# Patient Record
Sex: FEMALE | Race: BLACK OR AFRICAN AMERICAN | NOT HISPANIC OR LATINO | Employment: UNEMPLOYED | URBAN - METROPOLITAN AREA
[De-identification: names, ages, dates, MRNs, and addresses within clinical notes are randomized per-mention and may not be internally consistent; named-entity substitution may affect disease eponyms.]

---

## 2023-05-25 ENCOUNTER — PATIENT OUTREACH (OUTPATIENT)
Dept: PRIMARY CARE | Facility: CLINIC | Age: 43
End: 2023-05-25
Payer: MEDICAID

## 2023-05-25 DIAGNOSIS — L02.31 GLUTEAL ABSCESS: ICD-10-CM

## 2023-05-25 DIAGNOSIS — E11.00 TYPE 2 DIABETES MELLITUS WITH HYPEROSMOLARITY WITHOUT COMA, WITHOUT LONG-TERM CURRENT USE OF INSULIN (MULTI): ICD-10-CM

## 2023-05-25 DIAGNOSIS — N18.9 CHRONIC KIDNEY DISEASE, UNSPECIFIED CKD STAGE: ICD-10-CM

## 2023-05-25 DIAGNOSIS — E16.2 HYPOGLYCEMIA: ICD-10-CM

## 2023-05-25 DIAGNOSIS — D50.9 IRON DEFICIENCY ANEMIA, UNSPECIFIED IRON DEFICIENCY ANEMIA TYPE: ICD-10-CM

## 2023-05-25 RX ORDER — TRAMADOL HYDROCHLORIDE 50 MG/1
50 TABLET ORAL
COMMUNITY
End: 2023-06-06 | Stop reason: SDUPTHER

## 2023-05-25 RX ORDER — ROSUVASTATIN CALCIUM 20 MG/1
20 TABLET, COATED ORAL DAILY
COMMUNITY
End: 2023-06-08 | Stop reason: SDUPTHER

## 2023-05-25 RX ORDER — POLYETHYLENE GLYCOL 3350 17 G/17G
17 POWDER, FOR SOLUTION ORAL DAILY
COMMUNITY

## 2023-05-25 RX ORDER — CHLORTHALIDONE 25 MG/1
25 TABLET ORAL DAILY
COMMUNITY
End: 2023-06-26 | Stop reason: SDUPTHER

## 2023-05-25 NOTE — PROGRESS NOTES
Discharge Facility:[facility]   Discharge Diagnosis:Hypoglycemia, Iron deficiency anemia, Gluteal abscess, CKD, Type 2 DM  Admission Date:5/21/23  Discharge Date: 5/24/23    PCP Appointment Date:Patient needs follow up visit   Specialist Appointment Date:   Hospital Encounter and Summary: Linked   See discharge assessment below for further details    Engagement  Call Start Time: 0940 (5/25/2023 12:00 PM)    Medications  Medications reviewed with patient/caregiver?: Yes (5/25/2023 12:00 PM)  Is the patient having any side effects they believe may be caused by any medication additions or changes?: No (5/25/2023 12:00 PM)  Does the patient have all medications ordered at discharge?: No (Patient reported not having Jardiance 10mg an rosuavastatin 20 mg, will follow up) (5/25/2023 12:00 PM)  What is keeping the patient from filling the prescriptions?: Patient desires to consult PCP (5/25/2023 12:00 PM)  Prescription Comments: see med list (5/25/2023 12:00 PM)  Is the patient taking all medications as directed (includes completed medication regime)?: Yes (patient did not recieve jardiance 10mg and rosuvastatin 20 mg) (5/25/2023 12:00 PM)  Medication Comments: see med list (5/25/2023 12:00 PM)    Appointments  Does the patient have a primary care provider?: Yes (5/25/2023 12:00 PM)  Care Management Interventions: Advised patient to make appointment; Educated patient on importance of making appointment (5/25/2023 12:00 PM)  Has the patient kept scheduled appointments due by today?: Yes (5/25/2023 12:00 PM)    Patient Teaching  Does the patient have access to their discharge instructions?: Yes (5/25/2023 12:00 PM)  Care Management Interventions: Reviewed instructions with patient (5/25/2023 12:00 PM)  What is the patient's perception of their health status since discharge?: Improving (5/25/2023 12:00 PM)  Is the patient/caregiver able to teach back the hierarchy of who to call/visit for symptoms/problems? PCP, Specialist,  Home Health nurse, Urgent Care, ED, 911: Yes (5/25/2023 12:00 PM)

## 2023-05-26 DIAGNOSIS — E16.2 HYPOGLYCEMIA: ICD-10-CM

## 2023-05-26 DIAGNOSIS — N18.9 CHRONIC KIDNEY DISEASE, UNSPECIFIED CKD STAGE: Primary | ICD-10-CM

## 2023-05-26 DIAGNOSIS — E11.00 TYPE 2 DIABETES MELLITUS WITH HYPEROSMOLARITY WITHOUT COMA, WITHOUT LONG-TERM CURRENT USE OF INSULIN (MULTI): ICD-10-CM

## 2023-05-26 DIAGNOSIS — D50.9 IRON DEFICIENCY ANEMIA, UNSPECIFIED IRON DEFICIENCY ANEMIA TYPE: ICD-10-CM

## 2023-05-26 DIAGNOSIS — L02.31 GLUTEAL ABSCESS: ICD-10-CM

## 2023-06-01 PROBLEM — E55.9 VITAMIN D DEFICIENCY: Status: ACTIVE | Noted: 2023-06-01

## 2023-06-01 PROBLEM — I51.7 LVH (LEFT VENTRICULAR HYPERTROPHY): Status: ACTIVE | Noted: 2023-06-01

## 2023-06-01 PROBLEM — D64.9 ANEMIA: Status: ACTIVE | Noted: 2023-06-01

## 2023-06-01 PROBLEM — K59.03 DRUG-INDUCED CONSTIPATION: Status: ACTIVE | Noted: 2023-06-01

## 2023-06-01 PROBLEM — R73.09 ABNORMAL GLUCOSE: Status: ACTIVE | Noted: 2023-06-01

## 2023-06-01 PROBLEM — J30.9 ALLERGIC SINUSITIS: Status: ACTIVE | Noted: 2023-06-01

## 2023-06-01 PROBLEM — H61.23 BILATERAL IMPACTED CERUMEN: Status: ACTIVE | Noted: 2023-06-01

## 2023-06-01 PROBLEM — K11.8 MASS OF PAROTID GLAND: Status: ACTIVE | Noted: 2023-06-01

## 2023-06-01 PROBLEM — I10 HYPERTENSION: Status: ACTIVE | Noted: 2023-06-01

## 2023-06-01 PROBLEM — R94.31 ABNORMAL EKG: Status: ACTIVE | Noted: 2023-06-01

## 2023-06-01 PROBLEM — E04.9 GOITER: Status: ACTIVE | Noted: 2023-06-01

## 2023-06-01 PROBLEM — H53.8 BLURRING, BILATERAL: Status: ACTIVE | Noted: 2023-06-01

## 2023-06-01 PROBLEM — F39 MOOD DISORDER (CMS-HCC): Status: ACTIVE | Noted: 2023-06-01

## 2023-06-01 PROBLEM — E11.9 DIABETES (MULTI): Status: ACTIVE | Noted: 2023-06-01

## 2023-06-01 RX ORDER — AMLODIPINE AND OLMESARTAN MEDOXOMIL 10; 40 MG/1; MG/1
1 TABLET ORAL DAILY
COMMUNITY
End: 2023-08-08 | Stop reason: SDUPTHER

## 2023-06-01 RX ORDER — AMOXICILLIN AND CLAVULANATE POTASSIUM 875; 125 MG/1; MG/1
1 TABLET, FILM COATED ORAL EVERY 12 HOURS
Qty: 20 TABLET | Refills: 0 | COMMUNITY
Start: 2023-05-19 | End: 2023-05-29

## 2023-06-01 RX ORDER — BLACK COHOSH ROOT 200 MG
500 CAPSULE ORAL DAILY
COMMUNITY
Start: 2023-05-19

## 2023-06-01 RX ORDER — FERROUS SULFATE TAB 325 MG (65 MG ELEMENTAL FE) 325 (65 FE) MG
1 TAB ORAL DAILY
COMMUNITY
Start: 2023-05-19 | End: 2023-06-26 | Stop reason: SDUPTHER

## 2023-06-01 RX ORDER — FLASH GLUCOSE SCANNING READER
EACH MISCELLANEOUS
COMMUNITY
Start: 2023-05-22

## 2023-06-01 RX ORDER — SODIUM BICARBONATE 650 MG/1
650 TABLET ORAL 3 TIMES DAILY
COMMUNITY
Start: 2023-05-19 | End: 2023-07-31 | Stop reason: SDUPTHER

## 2023-06-01 RX ORDER — PANTOPRAZOLE SODIUM 40 MG/1
TABLET, DELAYED RELEASE ORAL
COMMUNITY
Start: 2023-05-19 | End: 2023-06-26 | Stop reason: SDUPTHER

## 2023-06-01 RX ORDER — ISOPROPYL ALCOHOL 70 ML/100ML
SWAB TOPICAL
COMMUNITY
Start: 2023-05-22

## 2023-06-01 RX ORDER — METHYLPREDNISOLONE 4 MG/1
TABLET ORAL
COMMUNITY
Start: 2019-05-13 | End: 2023-09-12 | Stop reason: ALTCHOICE

## 2023-06-01 RX ORDER — INSULIN LISPRO 100 [IU]/ML
INJECTION, SOLUTION INTRAVENOUS; SUBCUTANEOUS
COMMUNITY
Start: 2023-05-19 | End: 2023-06-26 | Stop reason: ALTCHOICE

## 2023-06-01 RX ORDER — LANCETS 30 GAUGE
EACH MISCELLANEOUS
COMMUNITY
Start: 2023-05-22

## 2023-06-01 RX ORDER — METFORMIN HYDROCHLORIDE 500 MG/1
2 TABLET ORAL 2 TIMES DAILY
COMMUNITY
Start: 2022-02-01 | End: 2023-06-26 | Stop reason: SINTOL

## 2023-06-01 RX ORDER — IBUPROFEN 200 MG
TABLET ORAL
COMMUNITY
Start: 2023-05-19

## 2023-06-01 RX ORDER — POTASSIUM & SODIUM PHOSPHATES POWDER PACK 280-160-250 MG 280-160-250 MG
PACK ORAL
COMMUNITY
Start: 2023-05-19

## 2023-06-01 RX ORDER — LANOLIN ALCOHOL/MO/W.PET/CERES
1 CREAM (GRAM) TOPICAL 2 TIMES DAILY
COMMUNITY
Start: 2023-05-19 | End: 2023-06-26 | Stop reason: SDUPTHER

## 2023-06-01 RX ORDER — PEN NEEDLE, DIABETIC 32GX 5/32"
NEEDLE, DISPOSABLE MISCELLANEOUS
COMMUNITY
Start: 2023-05-22

## 2023-06-01 RX ORDER — INSULIN HUMAN 100 [IU]/ML
INJECTION, SUSPENSION SUBCUTANEOUS
COMMUNITY
Start: 2023-05-19 | End: 2023-06-13

## 2023-06-01 RX ORDER — BLOOD SUGAR DIAGNOSTIC
STRIP MISCELLANEOUS
COMMUNITY
Start: 2023-05-22

## 2023-06-01 RX ORDER — ATENOLOL AND CHLORTHALIDONE TABLET 100; 25 MG/1; MG/1
1 TABLET ORAL DAILY
COMMUNITY
End: 2023-06-26 | Stop reason: ALTCHOICE

## 2023-06-01 RX ORDER — FLASH GLUCOSE SENSOR
KIT MISCELLANEOUS
COMMUNITY
Start: 2023-05-22 | End: 2023-06-21

## 2023-06-01 RX ORDER — LANCETS 33 GAUGE
EACH MISCELLANEOUS
COMMUNITY
Start: 2023-05-22

## 2023-06-01 RX ORDER — GABAPENTIN 100 MG/1
100 CAPSULE ORAL EVERY 12 HOURS
COMMUNITY
Start: 2023-05-19 | End: 2023-06-30 | Stop reason: SDUPTHER

## 2023-06-01 RX ORDER — VIT C/E/ZN/COPPR/LUTEIN/ZEAXAN 250MG-90MG
25 CAPSULE ORAL DAILY
COMMUNITY
End: 2023-06-26 | Stop reason: ALTCHOICE

## 2023-06-06 ENCOUNTER — TELEMEDICINE (OUTPATIENT)
Dept: PHARMACY | Facility: HOSPITAL | Age: 43
End: 2023-06-06
Payer: MEDICAID

## 2023-06-06 ENCOUNTER — OFFICE VISIT (OUTPATIENT)
Dept: PRIMARY CARE | Facility: CLINIC | Age: 43
End: 2023-06-06
Payer: MEDICAID

## 2023-06-06 VITALS
SYSTOLIC BLOOD PRESSURE: 115 MMHG | DIASTOLIC BLOOD PRESSURE: 80 MMHG | TEMPERATURE: 98.2 F | HEART RATE: 75 BPM | WEIGHT: 178 LBS | BODY MASS INDEX: 31.54 KG/M2 | OXYGEN SATURATION: 100 % | HEIGHT: 63 IN

## 2023-06-06 DIAGNOSIS — E16.2 HYPOGLYCEMIA: Primary | ICD-10-CM

## 2023-06-06 DIAGNOSIS — E16.2 HYPOGLYCEMIA: ICD-10-CM

## 2023-06-06 DIAGNOSIS — L02.31 GLUTEAL ABSCESS: ICD-10-CM

## 2023-06-06 DIAGNOSIS — I10 HYPERTENSION, UNSPECIFIED TYPE: ICD-10-CM

## 2023-06-06 DIAGNOSIS — L03.90 WOUND CELLULITIS: ICD-10-CM

## 2023-06-06 DIAGNOSIS — E11.9 TYPE 2 DIABETES MELLITUS WITHOUT COMPLICATION, WITH LONG-TERM CURRENT USE OF INSULIN (MULTI): ICD-10-CM

## 2023-06-06 DIAGNOSIS — N18.9 CHRONIC KIDNEY DISEASE, UNSPECIFIED CKD STAGE: ICD-10-CM

## 2023-06-06 DIAGNOSIS — E55.9 VITAMIN D DEFICIENCY: ICD-10-CM

## 2023-06-06 DIAGNOSIS — F39 MOOD DISORDER (CMS-HCC): ICD-10-CM

## 2023-06-06 DIAGNOSIS — Z79.4 TYPE 2 DIABETES MELLITUS WITHOUT COMPLICATION, WITH LONG-TERM CURRENT USE OF INSULIN (MULTI): ICD-10-CM

## 2023-06-06 DIAGNOSIS — E11.00 TYPE 2 DIABETES MELLITUS WITH HYPEROSMOLARITY WITHOUT COMA, WITHOUT LONG-TERM CURRENT USE OF INSULIN (MULTI): ICD-10-CM

## 2023-06-06 DIAGNOSIS — L02.91 ABSCESS: ICD-10-CM

## 2023-06-06 DIAGNOSIS — D64.9 ANEMIA, UNSPECIFIED TYPE: ICD-10-CM

## 2023-06-06 DIAGNOSIS — D50.9 IRON DEFICIENCY ANEMIA, UNSPECIFIED IRON DEFICIENCY ANEMIA TYPE: ICD-10-CM

## 2023-06-06 PROBLEM — R68.84 JAW PAIN: Status: ACTIVE | Noted: 2023-06-06

## 2023-06-06 PROCEDURE — 99495 TRANSJ CARE MGMT MOD F2F 14D: CPT | Performed by: STUDENT IN AN ORGANIZED HEALTH CARE EDUCATION/TRAINING PROGRAM

## 2023-06-06 PROCEDURE — 3074F SYST BP LT 130 MM HG: CPT | Performed by: STUDENT IN AN ORGANIZED HEALTH CARE EDUCATION/TRAINING PROGRAM

## 2023-06-06 PROCEDURE — 3066F NEPHROPATHY DOC TX: CPT | Performed by: STUDENT IN AN ORGANIZED HEALTH CARE EDUCATION/TRAINING PROGRAM

## 2023-06-06 PROCEDURE — 3079F DIAST BP 80-89 MM HG: CPT | Performed by: STUDENT IN AN ORGANIZED HEALTH CARE EDUCATION/TRAINING PROGRAM

## 2023-06-06 RX ORDER — TRAMADOL HYDROCHLORIDE 50 MG/1
50 TABLET ORAL EVERY 4 HOURS PRN
Qty: 40 TABLET | Refills: 0 | Status: SHIPPED | OUTPATIENT
Start: 2023-06-06 | End: 2023-06-13

## 2023-06-06 ASSESSMENT — ENCOUNTER SYMPTOMS
DEPRESSION: 0
OCCASIONAL FEELINGS OF UNSTEADINESS: 0
LOSS OF SENSATION IN FEET: 0

## 2023-06-06 ASSESSMENT — COLUMBIA-SUICIDE SEVERITY RATING SCALE - C-SSRS
2. HAVE YOU ACTUALLY HAD ANY THOUGHTS OF KILLING YOURSELF?: NO
1. IN THE PAST MONTH, HAVE YOU WISHED YOU WERE DEAD OR WISHED YOU COULD GO TO SLEEP AND NOT WAKE UP?: NO
6. HAVE YOU EVER DONE ANYTHING, STARTED TO DO ANYTHING, OR PREPARED TO DO ANYTHING TO END YOUR LIFE?: NO

## 2023-06-06 ASSESSMENT — PATIENT HEALTH QUESTIONNAIRE - PHQ9
SUM OF ALL RESPONSES TO PHQ9 QUESTIONS 1 AND 2: 0
2. FEELING DOWN, DEPRESSED OR HOPELESS: NOT AT ALL
1. LITTLE INTEREST OR PLEASURE IN DOING THINGS: NOT AT ALL

## 2023-06-06 NOTE — PROGRESS NOTES
"42-year-old female here for hospital follow-up.  Admitted 5/21/2023, discharged 5/24/2023    \"Hospital Course:   JOSE C TORRES is a 42 year old Female with a past medical history of HLD, HTN,   T2DM (last HgA1c 16.5% 5/9/23), CKD stage 3, iron deficiency anemia and LVH. Pt   presented to the ED this morning via EMS after she found unresponsive at home   by her niece. Per niece, blood and sputum was on the ground next to patient.   [place] [facility] [place] a [place] glucose of 26   and repeat glucose was 45. Pt arrived to ED on 15L via nonrebreather mask. Pt   told writer that she took 15 units of NPH this morning and then took a nap   because she was told to wait 30 minutes to eat after taking insulin. Pt did not   check her blood glucose this morning prior to administering 15 units of NPH. Pt   has not been checking her blood glucose at home since she was discharged from   [facility]. She does have a glucometer at home. Pt would benefit from more   diabetes education and home health nurse on d/c to assist with new diabetes   regimen. Initial blood glucose on arrival to ED was 26 and last blood glucose   obtained at 1528 was 162. Labs obtained on admit notable for anemia, elevated   Cr and GFR, thrombocytosis, hypocalcemia, and  hypoglycemia. CT of C-spine,   L-spine, and CTH negative for anything acute. CXR negative. CT chest/abd/pelvis   showed multiple rim enhancing abscesses in the left mid and posterior perineum   and inferior medial left buttock as described in detail above with surrounding   cellulitis and multiple areas of skin breakdown, no evidence of deep muscle   infection or intrapelvic extension, and pulmonary edema with small bilateral   pleural effusions. ACS consulted by writer d/t abscesses seen on imaging. Upon   chart review, patient was just recently hospitalized at [facility] 5/9-5/18 due   to DKA, gluteal abscess, and anemia. Pt had bedside I&D of gluteal abscess on " "  5/9/23 as well as I&D in the OR on 5/18/23. Pt was started on Unasyn during   this admit and transitioned to Augmentin 875 mg BID on 5/19/23 with a stop date   of 5/28/23. Pt was started on insulin this recent admit. Home regimen on   discharge was mild SSI using Humalog pen and NPH BID (15 units every morning   with breakfast and 30 units at bedtime. Blood cultures obtained during recent   hospitalization were NGTD. Wound cultures of abscess obtained on 5/9/23 grew   moderate gram + cocci and bacilli. Pt resting comfortably when seen by writer   in the ED. She denies SOB, chest pain, n/v/d, constipation, fever, chills,   headache, abdominal pain, dysuria, or chest pain. Pt is currently on RA.   Decision made to admit patient to medicine service for further treatment and   management of hypoglycemia.\"      Further, patient had significant KUSHAL, and significant anemia, requiring blood transfusion.    Patient feeling significantly better, some weakness, was doing physical therapy at the hospital and has improving.  Still has open wound on right posterior thigh following multiple abscess drainage, currently bandaged.    Is using continuous glucose monitoring, and following diabetic regimen appropriately.  Taking all medications as prescribed.    12 point ROS reviewed and negative other than as stated in HPI    General: Alert, oriented, pleasant, in no acute distress  HEENT:      Head: normocephalic, atraumatic;      eyes: EOMI, no scleral icterus;   CV: Heart with regular rate and rhythm, normal S1/S2, no murmurs  Lungs: CTAB without wheezing, rhonchi or rales; good respiratory effort, no increased work of breathing  Abdomen: soft, non-tender, non-distended, no masses appreciated  Extremities: no edema, no cyanosis  Neuro: Cranial nerves grossly intact; alert and oriented  Psych: Appropriate mood and affect    1.  Hypoglycemia 2. DMII  - Significant improvement, using freestyle federico and staying at goal  - Following " SSI regimen  -Have discussed with [facility] pharmacist, would like to maximize her other medical options and lower insulin regimen is much as possible given hypoglycemic event  - Continue CGM and close follow-up    3.  KUSHAL  - Repeat CMP    4.  Anemia  - Repeat CBC    5.  Abscesses  - General surgery for further evaluation and management    6.  HTN  - At goal in office  - Continue amlodipine-olmesartan 10-40 mg daily  -Patient will check at home to see if she has been taking atenolol-chlorthalidone, if not, can probably defer at this time given good blood pressure readings    Follow-up 3 months    Christopher D'Amico, DO

## 2023-06-06 NOTE — PROGRESS NOTES
Pharmacist Clinic: Diabetes Management  Jennifer Overton is a 42 y.o. female was referred to Clinical Pharmacy Team for diabetes management.     Referring Provider: Christopher D'Amico, DO     HISTORY OF PRESENT ILLNESS   - Patient states she was diagnosed with diabetes and never took it seriously. After her recent hospitalization she started to take control of her disease state given her BG was above 1000 the wounds on her body were not healing. In turn she ended up in the hospital for a hypoglycemic event.   - T2DM complicated with CKD stage 3, hyper/hypoglycemic events     LAB REVIEW   Glucose   Date Value   05/25/2023 CANCELED   05/24/2023 204 mg/dL (H)   05/22/2023 275 mg/dL (H)     Hemoglobin A1C (%)   Date Value   02/24/2022 9.6 (A)   04/02/2019 9.7     Bicarbonate   Date Value   05/25/2023 CANCELED   05/24/2023 24 mmol/L   05/22/2023 22 mmol/L     Urea Nitrogen   Date Value   05/25/2023 CANCELED   05/24/2023 12 mg/dL   05/22/2023 11 mg/dL     Creatinine   Date Value   05/25/2023 CANCELED   05/24/2023 1.74 mg/dL (H)   05/22/2023 1.54 mg/dL (H)     Lab Results   Component Value Date    HGBA1C 9.6 (A) 02/24/2022    HGBA1C 9.7 04/02/2019     Lab Results   Component Value Date    CHOL 170 02/24/2022    CHOL 183 04/02/2019     Lab Results   Component Value Date    HDL 51.5 02/24/2022    HDL 47.6 04/02/2019     No results found for: LDLCALC  Lab Results   Component Value Date    TRIG 183 (H) 02/24/2022    TRIG 173 (H) 04/02/2019       DIABETES ASSESSMENT    CURRENT PHARMACOTHERAPY  - humalog sliding scale: patient administers approx 5 times per week   --> 2 units for  -199  --> 4 units for -250  --> 6 unit for -300  --> 8 units for  - 400  --> 10 units for anything over 400  - humulin: 15 units in the AM, 30 units in the PM    SECONDARY PREVENTION  - Statin? Yes; rosuvastatin on med list  - ACE-I/ARB? Yes; olmesartan on med list    HISTORICAL PHARMACOTHERAPY  - metformin: patient stopped taking  "this b/c she had ulcers on her butt and she did not want diarrhea to get in it   - trulicity: patient developed hives from the medication, she states she had a hard time breathing and in turn went tot he hospital for further assistance     DISCUSSION:   - Discussed goals of diabetes care with patient, discussed we will be talking every 2-3 days until we are happy with her disease state control   - Discussed CGM: patients last one fell off 3 days early, she has about 11 days left on her current one. Gave patient G phone number to call for replacement for her old one.   - Discussed how patient got on her current insulin regimen, she advised that when she was in the hospital they were giving her shots throughout the day. When she left the hospital they discharged her on the same insulin dosage she was on inpatient.     RECOMMENDATIONS/PLAN  1. Patients diabetes is poorly controlled with most recent A1c of 15.6 % (goal < 7 %).   - Continue all meds under the continuation of care with the referring provider and clinical pharmacy team.  - Follow up in 2 days for blood glucose readings and to discuss next steps pending updated lab work.     2. Future plans:   1) discontinue mealtime insulin   2) consolidate basal insulin to once daily dosing  3) re-trial GLP1: PA will be needed for alternative GLP1 therapy as trulicity is the preferred medication on the patients insurance, consider once daily glp1 to start for short half life given history of \"allergy\" with trulicity   4) re-trial SGLT2i pending kidney function stabilization     Clinical Pharmacist follow up: 2 days at 2 PM    Thank you,  Shandra Cuellar, PharmD    Verbal consent to manage patient's drug therapy was obtained from the patient. They were informed they may decline to participate or withdraw from participation in pharmacy services at any time.   "

## 2023-06-06 NOTE — PATIENT INSTRUCTIONS
Admission to the hospital, reasons included acute kidney injury with significant increase in creatinine; severe anemia requiring blood transfusion.

## 2023-06-07 ENCOUNTER — LAB (OUTPATIENT)
Dept: LAB | Facility: LAB | Age: 43
End: 2023-06-07
Payer: MEDICAID

## 2023-06-07 DIAGNOSIS — E55.9 VITAMIN D DEFICIENCY: ICD-10-CM

## 2023-06-07 DIAGNOSIS — Z79.4 TYPE 2 DIABETES MELLITUS WITHOUT COMPLICATION, WITH LONG-TERM CURRENT USE OF INSULIN (MULTI): ICD-10-CM

## 2023-06-07 DIAGNOSIS — D64.9 ANEMIA, UNSPECIFIED TYPE: ICD-10-CM

## 2023-06-07 DIAGNOSIS — I10 HYPERTENSION, UNSPECIFIED TYPE: ICD-10-CM

## 2023-06-07 DIAGNOSIS — E11.9 TYPE 2 DIABETES MELLITUS WITHOUT COMPLICATION, WITH LONG-TERM CURRENT USE OF INSULIN (MULTI): ICD-10-CM

## 2023-06-07 DIAGNOSIS — E16.2 HYPOGLYCEMIA: ICD-10-CM

## 2023-06-07 DIAGNOSIS — F39 MOOD DISORDER (CMS-HCC): ICD-10-CM

## 2023-06-07 LAB
ALANINE AMINOTRANSFERASE (SGPT) (U/L) IN SER/PLAS: 7 U/L (ref 7–45)
ALBUMIN (G/DL) IN SER/PLAS: 3.9 G/DL (ref 3.4–5)
ALBUMIN (MG/L) IN URINE: 19.3 MG/L
ALBUMIN/CREATININE (UG/MG) IN URINE: 17.4 UG/MG CRT (ref 0–30)
ALKALINE PHOSPHATASE (U/L) IN SER/PLAS: 48 U/L (ref 33–110)
ANION GAP IN SER/PLAS: 19 MMOL/L (ref 10–20)
ASPARTATE AMINOTRANSFERASE (SGOT) (U/L) IN SER/PLAS: 14 U/L (ref 9–39)
BILIRUBIN TOTAL (MG/DL) IN SER/PLAS: 0.3 MG/DL (ref 0–1.2)
CALCIDIOL (25 OH VITAMIN D3) (NG/ML) IN SER/PLAS: 13 NG/ML
CALCIUM (MG/DL) IN SER/PLAS: 9.7 MG/DL (ref 8.6–10.6)
CARBON DIOXIDE, TOTAL (MMOL/L) IN SER/PLAS: 22 MMOL/L (ref 21–32)
CHLORIDE (MMOL/L) IN SER/PLAS: 99 MMOL/L (ref 98–107)
CHOLESTEROL (MG/DL) IN SER/PLAS: 267 MG/DL (ref 0–199)
CHOLESTEROL IN HDL (MG/DL) IN SER/PLAS: 52.4 MG/DL
CHOLESTEROL/HDL RATIO: 5.1
CREATININE (MG/DL) IN SER/PLAS: 1.42 MG/DL (ref 0.5–1.05)
CREATININE (MG/DL) IN URINE: 111 MG/DL (ref 20–320)
ERYTHROCYTE DISTRIBUTION WIDTH (RATIO) BY AUTOMATED COUNT: 22.1 % (ref 11.5–14.5)
ERYTHROCYTE MEAN CORPUSCULAR HEMOGLOBIN CONCENTRATION (G/DL) BY AUTOMATED: 31.3 G/DL (ref 32–36)
ERYTHROCYTE MEAN CORPUSCULAR VOLUME (FL) BY AUTOMATED COUNT: 94 FL (ref 80–100)
ERYTHROCYTES (10*6/UL) IN BLOOD BY AUTOMATED COUNT: 3.46 X10E12/L (ref 4–5.2)
ESTIMATED AVERAGE GLUCOSE FOR HBA1C: 209 MG/DL
GFR FEMALE: 47 ML/MIN/1.73M2
GLUCOSE (MG/DL) IN SER/PLAS: 267 MG/DL (ref 74–99)
HEMATOCRIT (%) IN BLOOD BY AUTOMATED COUNT: 32.6 % (ref 36–46)
HEMOGLOBIN (G/DL) IN BLOOD: 10.2 G/DL (ref 12–16)
HEMOGLOBIN A1C/HEMOGLOBIN TOTAL IN BLOOD: 8.9 %
LDL: 156 MG/DL (ref 0–99)
LEUKOCYTES (10*3/UL) IN BLOOD BY AUTOMATED COUNT: 7.3 X10E9/L (ref 4.4–11.3)
NON HDL CHOLESTEROL: 215 MG/DL
NRBC (PER 100 WBCS) BY AUTOMATED COUNT: 0 /100 WBC (ref 0–0)
PLATELETS (10*3/UL) IN BLOOD AUTOMATED COUNT: 372 X10E9/L (ref 150–450)
POTASSIUM (MMOL/L) IN SER/PLAS: 3.9 MMOL/L (ref 3.5–5.3)
PROTEIN TOTAL: 8.4 G/DL (ref 6.4–8.2)
SODIUM (MMOL/L) IN SER/PLAS: 136 MMOL/L (ref 136–145)
THYROTROPIN (MIU/L) IN SER/PLAS BY DETECTION LIMIT <= 0.05 MIU/L: 1.77 MIU/L (ref 0.44–3.98)
TRIGLYCERIDE (MG/DL) IN SER/PLAS: 291 MG/DL (ref 0–149)
UREA NITROGEN (MG/DL) IN SER/PLAS: 21 MG/DL (ref 6–23)
VLDL: 58 MG/DL (ref 0–40)

## 2023-06-07 PROCEDURE — 82306 VITAMIN D 25 HYDROXY: CPT

## 2023-06-07 PROCEDURE — 84443 ASSAY THYROID STIM HORMONE: CPT

## 2023-06-07 PROCEDURE — 85027 COMPLETE CBC AUTOMATED: CPT

## 2023-06-07 PROCEDURE — 82043 UR ALBUMIN QUANTITATIVE: CPT

## 2023-06-07 PROCEDURE — 82570 ASSAY OF URINE CREATININE: CPT

## 2023-06-07 PROCEDURE — 80053 COMPREHEN METABOLIC PANEL: CPT

## 2023-06-07 PROCEDURE — 36415 COLL VENOUS BLD VENIPUNCTURE: CPT

## 2023-06-07 PROCEDURE — 83036 HEMOGLOBIN GLYCOSYLATED A1C: CPT

## 2023-06-07 PROCEDURE — 80061 LIPID PANEL: CPT

## 2023-06-08 ENCOUNTER — TELEPHONE (OUTPATIENT)
Dept: PRIMARY CARE | Facility: CLINIC | Age: 43
End: 2023-06-08

## 2023-06-08 ENCOUNTER — TELEMEDICINE (OUTPATIENT)
Dept: PHARMACY | Facility: HOSPITAL | Age: 43
End: 2023-06-08
Payer: MEDICAID

## 2023-06-08 ENCOUNTER — DOCUMENTATION (OUTPATIENT)
Dept: CASE MANAGEMENT | Facility: HOSPITAL | Age: 43
End: 2023-06-08

## 2023-06-08 DIAGNOSIS — E11.9 TYPE 2 DIABETES MELLITUS WITHOUT COMPLICATION, WITH LONG-TERM CURRENT USE OF INSULIN (MULTI): Primary | ICD-10-CM

## 2023-06-08 DIAGNOSIS — E11.00 TYPE 2 DIABETES MELLITUS WITH HYPEROSMOLARITY WITHOUT COMA, WITHOUT LONG-TERM CURRENT USE OF INSULIN (MULTI): Primary | ICD-10-CM

## 2023-06-08 DIAGNOSIS — E55.9 VITAMIN D DEFICIENCY: Primary | ICD-10-CM

## 2023-06-08 DIAGNOSIS — E78.5 HYPERLIPIDEMIA, UNSPECIFIED HYPERLIPIDEMIA TYPE: ICD-10-CM

## 2023-06-08 DIAGNOSIS — Z79.4 TYPE 2 DIABETES MELLITUS WITHOUT COMPLICATION, WITH LONG-TERM CURRENT USE OF INSULIN (MULTI): Primary | ICD-10-CM

## 2023-06-08 RX ORDER — ROSUVASTATIN CALCIUM 20 MG/1
20 TABLET, COATED ORAL DAILY
Qty: 30 TABLET | Refills: 2 | Status: SHIPPED | OUTPATIENT
Start: 2023-06-08 | End: 2023-08-30 | Stop reason: SDUPTHER

## 2023-06-08 RX ORDER — ERGOCALCIFEROL 1.25 MG/1
50000 CAPSULE ORAL
Qty: 12 CAPSULE | Refills: 0 | Status: SHIPPED | OUTPATIENT
Start: 2023-06-08 | End: 2023-07-31 | Stop reason: SDUPTHER

## 2023-06-08 RX ORDER — LIRAGLUTIDE 6 MG/ML
INJECTION SUBCUTANEOUS
Qty: 9 ML | Refills: 2 | Status: SHIPPED | OUTPATIENT
Start: 2023-06-08 | End: 2023-09-28 | Stop reason: SDUPTHER

## 2023-06-08 NOTE — PROGRESS NOTES
Pharmacist Clinic: Diabetes Management  Jennifer Overton is a 42 y.o. female was referred to Clinical Pharmacy Team for diabetes management. At last visit, no medication changes were made.     Referring Provider: Christopher D'Amico, DO     HISTORY OF PRESENT ILLNESS   A1c one month ago of 16.5%, A1c 28 days later of 8.9%. Patient having frequent hypoglycemic events, one w/ losing consciousness and leading hospitalization.     LAB REVIEW   Glucose   Date Value   06/07/2023 267 mg/dL (H)   05/25/2023 CANCELED   05/24/2023 204 mg/dL (H)     Hemoglobin A1C (%)   Date Value   06/07/2023 8.9 (A)   02/24/2022 9.6 (A)   04/02/2019 9.7     Bicarbonate   Date Value   06/07/2023 22 mmol/L   05/25/2023 CANCELED   05/24/2023 24 mmol/L     Urea Nitrogen   Date Value   06/07/2023 21 mg/dL   05/25/2023 CANCELED   05/24/2023 12 mg/dL     Creatinine   Date Value   06/07/2023 1.42 mg/dL (H)   05/25/2023 CANCELED   05/24/2023 1.74 mg/dL (H)     Lab Results   Component Value Date    HGBA1C 8.9 (A) 06/07/2023    HGBA1C 9.6 (A) 02/24/2022    HGBA1C 9.7 04/02/2019     Lab Results   Component Value Date    CHOL 267 (H) 06/07/2023    CHOL 170 02/24/2022    CHOL 183 04/02/2019     Lab Results   Component Value Date    HDL 52.4 06/07/2023    HDL 51.5 02/24/2022    HDL 47.6 04/02/2019     No results found for: LDLCALC  Lab Results   Component Value Date    TRIG 291 (H) 06/07/2023    TRIG 183 (H) 02/24/2022    TRIG 173 (H) 04/02/2019       DIABETES ASSESSMENT    CURRENT PHARMACOTHERAPY  - humalog sliding scale: patient administers approx 5 times per week   --> 2 units for  -199  --> 4 units for -250  --> 6 unit for -300  --> 8 units for  - 400  --> 10 units for anything over 400  - humulin: 15 units in the AM, 30 units in the PM    SECONDARY PREVENTION  - Statin? Yes; rosuvastatin on med list  - ACE-I/ARB? Yes; olmesartan on med list    HISTORICAL PHARMACOTHERAPY  - metformin: patient stopped taking this b/c she had ulcers on  her butt and she did not want diarrhea to get in it   - trulicity: patient developed hives from the medication, she states she had a hard time breathing and in turn went tot he hospital for further assistance     SMBG:   - 6/6 before bed 131   - 6/7 fasting 212   - 6/7 post insulin and lunch (no SS used) 158  - 6/7 symptomatic hypo event 58, patient ate a honey bun   - 6/7 before bed 169   - 6/8 fasting 194    DISCUSSION:   - Discussed SMBG readings, discussed A1c, discussed we do not like to see numbers under 70 as it puts us at risk for hypoglycemic events  - Discussed balancing out insulin   - Discussed once daily injection of Victoza, discussed it is similar to trulicity but it is every day instead of every week, discussed cross-reactivity is low given trulicity allergy     RECOMMENDATIONS/PLAN  1. Patients diabetes is poorly controlled with most recent A1c of 8.9%, down from 16.5% one month prior (goal < 7 %).   - Continue all meds under the continuation of care with the referring provider and clinical pharmacy team.  - Initiate victoza 0.6 mg under the skin once daily. Titrate as tolerated.   - Decrease humulin to 15 units BID.   - Discontinue sliding scale insulin, given minimal use this should not be a problem.   - Keep up the good work of testing your blood glucose frequently throughout the day.     2. Future plans:   - consolidate basal insulin to once daily dosing with a long acting insulin such as tresiba to avoid hypoglycemic events  - re-trial SGLT2i pending kidney function stabilization     Clinical Pharmacist follow up: 5 days at 2 PM    Thank you,  Shandra Cuellar, PharmD    Verbal consent to manage patient's drug therapy was obtained from the patient. They were informed they may decline to participate or withdraw from participation in pharmacy services at any time.

## 2023-06-08 NOTE — PROGRESS NOTES
Call regarding appt. with PCP on 6/6/23 after hospitalization.  At time of outreach call the patient feels as if their condition has returned to baseline since last visit.  Patient reported that she still was waiting on a call from the office to receive dressings for wound changes.  Nursing called office to check on dressings needed for wound changes.  Reviewed with patient the PCP appointment and any questions or concerns regarding the appt.

## 2023-06-08 NOTE — RESULT ENCOUNTER NOTE
Hemoglobin improving, at 10.2,    LDL at 156, well above diabetic goal of 70.  Triglycerides significantly elevated 291, likely secondary to high blood sugar.  Please make sure she is taking her rosuvastatin that is in her chart.    Kidney function has increased, still low from last year's labs.  We will continue to monitor.  Continue to keep blood pressure and blood sugar under control.    Vitamin D significantly low at 13, recommend weekly supplementation x12 weeks, sent to pharmacy with 3-month follow-up lab.    Hemoglobin A1c at 8.9, well above goal of 7, continue to work with Shandra the pharmacist and follow-up every 3 months with me.    Remaining labs unremarkable.

## 2023-06-13 ENCOUNTER — TELEMEDICINE (OUTPATIENT)
Dept: PHARMACY | Facility: HOSPITAL | Age: 43
End: 2023-06-13
Payer: MEDICAID

## 2023-06-13 DIAGNOSIS — E11.00 TYPE 2 DIABETES MELLITUS WITH HYPEROSMOLARITY WITHOUT COMA, WITHOUT LONG-TERM CURRENT USE OF INSULIN (MULTI): Primary | ICD-10-CM

## 2023-06-13 RX ORDER — INSULIN DEGLUDEC 100 U/ML
24 INJECTION, SOLUTION SUBCUTANEOUS EVERY MORNING
Qty: 15 ML | Refills: 1 | Status: SHIPPED | OUTPATIENT
Start: 2023-06-13

## 2023-06-13 NOTE — PROGRESS NOTES
"Pharmacist Clinic: Diabetes Management  Jennifer Overton is a 42 y.o. female was referred to Clinical Pharmacy Team for diabetes management. At last visit, no medication changes were made.     Referring Provider: Christopher D'Amico, DO     HISTORY OF PRESENT ILLNESS   A1c one month ago of 16.5%, A1c 28 days later of 8.9%. Patient having frequent hypoglycemic events, one w/ losing consciousness and leading hospitalization.     LAB REVIEW   Glucose   Date Value   06/07/2023 267 mg/dL (H)   05/25/2023 CANCELED   05/24/2023 204 mg/dL (H)     Hemoglobin A1C (%)   Date Value   06/07/2023 8.9 (A)   05/09/2023 16.5 (H)   02/24/2022 9.6 (A)   04/02/2019 9.7     Bicarbonate   Date Value   06/07/2023 22 mmol/L   05/25/2023 CANCELED   05/24/2023 24 mmol/L     Urea Nitrogen   Date Value   06/07/2023 21 mg/dL   05/25/2023 CANCELED   05/24/2023 12 mg/dL     Creatinine   Date Value   06/07/2023 1.42 mg/dL (H)   05/25/2023 CANCELED   05/24/2023 1.74 mg/dL (H)     Lab Results   Component Value Date    HGBA1C 8.9 (A) 06/07/2023    HGBA1C 16.5 (H) 05/09/2023    HGBA1C 9.6 (A) 02/24/2022     Lab Results   Component Value Date    CHOL 267 (H) 06/07/2023    CHOL 170 02/24/2022    CHOL 183 04/02/2019     Lab Results   Component Value Date    HDL 52.4 06/07/2023    HDL 51.5 02/24/2022    HDL 47.6 04/02/2019     No results found for: \"LDLCALC\"  Lab Results   Component Value Date    TRIG 291 (H) 06/07/2023    TRIG 183 (H) 02/24/2022    TRIG 173 (H) 04/02/2019       DIABETES ASSESSMENT    CURRENT PHARMACOTHERAPY  - humulin: 15 units in the AM, 15 units in the PM  - victoza 0.6 mg under the skin once daily, has not started yet, plan to start next week     SECONDARY PREVENTION  - Statin? Yes; rosuvastatin on med list  - ACE-I/ARB? Yes; olmesartan on med list    HISTORICAL PHARMACOTHERAPY  - metformin: patient stopped taking this b/c she had ulcers on her butt and she did not want diarrhea to get in it   - trulicity: patient developed hives from the " medication, she states she had a hard time breathing and in turn went tot [facility] for further assistance     SMBG:    Fasting  2 hours after lunch   6/9 190 133   6/10 171 159   6/11 158 200   6/12 195 140   6/13 154 190     DISCUSSION:   - Discussed that patient is about to run out of her insulin pens, discussed this is a good time to change her to a better insulin that is once every morning  - Discussed we will decrease her total daily dose and we will chat frequently while we get her to where she needs to be    RECOMMENDATIONS/PLAN  1. Patients diabetes is poorly controlled with most recent A1c of 8.9%, down from 16.5% one month prior (goal < 7 %).   - Continue all meds under the continuation of care with the referring provider and clinical pharmacy team.  - Discontinue humulin 15 units BID.   - Initiate Tresiba 24 units under the skin once every morning. Current TDD of insulin of 30 units, decrease by 20% for 24 units once daily.     2. Future plans:   - re-trial SGLT2i pending kidney function stabilization     Clinical Pharmacist follow up: 2 days at 2 PM    Thank you,  Shandra Cuellar, PharmD    Verbal consent to manage patient's drug therapy was obtained from the patient. They were informed they may decline to participate or withdraw from participation in pharmacy services at any time.

## 2023-06-15 ENCOUNTER — APPOINTMENT (OUTPATIENT)
Dept: PHARMACY | Facility: HOSPITAL | Age: 43
End: 2023-06-15
Payer: MEDICAID

## 2023-06-19 ENCOUNTER — TELEMEDICINE (OUTPATIENT)
Dept: PHARMACY | Facility: HOSPITAL | Age: 43
End: 2023-06-19
Payer: MEDICAID

## 2023-06-19 DIAGNOSIS — E11.00 TYPE 2 DIABETES MELLITUS WITH HYPEROSMOLARITY WITHOUT COMA, WITHOUT LONG-TERM CURRENT USE OF INSULIN (MULTI): Primary | ICD-10-CM

## 2023-06-19 NOTE — PROGRESS NOTES
"Pharmacist Clinic: Diabetes Management  Jennifer Overton is a 42 y.o. female was referred to Clinical Pharmacy Team for diabetes management. At last visit, insulin was changed to once daily lantus.     Referring Provider: Christopher D'Amico, DO     HISTORY OF PRESENT ILLNESS   A1c one month ago of 16.5%, A1c 28 days later of 8.9%. Patient having frequent hypoglycemic events, one w/ losing consciousness and leading hospitalization.     LAB REVIEW   Glucose   Date Value   06/07/2023 267 mg/dL (H)   05/25/2023 CANCELED   05/24/2023 204 mg/dL (H)     Hemoglobin A1C (%)   Date Value   06/07/2023 8.9 (A)   05/09/2023 16.5 (H)   02/24/2022 9.6 (A)   04/02/2019 9.7     Bicarbonate   Date Value   06/07/2023 22 mmol/L   05/25/2023 CANCELED   05/24/2023 24 mmol/L     Urea Nitrogen   Date Value   06/07/2023 21 mg/dL   05/25/2023 CANCELED   05/24/2023 12 mg/dL     Creatinine   Date Value   06/07/2023 1.42 mg/dL (H)   05/25/2023 CANCELED   05/24/2023 1.74 mg/dL (H)     Lab Results   Component Value Date    HGBA1C 8.9 (A) 06/07/2023    HGBA1C 16.5 (H) 05/09/2023    HGBA1C 9.6 (A) 02/24/2022     Lab Results   Component Value Date    CHOL 267 (H) 06/07/2023    CHOL 170 02/24/2022    CHOL 183 04/02/2019     Lab Results   Component Value Date    HDL 52.4 06/07/2023    HDL 51.5 02/24/2022    HDL 47.6 04/02/2019     No results found for: \"LDLCALC\"  Lab Results   Component Value Date    TRIG 291 (H) 06/07/2023    TRIG 183 (H) 02/24/2022    TRIG 173 (H) 04/02/2019       DIABETES ASSESSMENT    CURRENT PHARMACOTHERAPY  - lantus 24 units once daily.   - victoza 0.6 mg under the skin once daily    SECONDARY PREVENTION  - Statin? Yes; rosuvastatin on med list  - ACE-I/ARB? Yes; olmesartan on med list    HISTORICAL PHARMACOTHERAPY  - metformin: patient stopped taking this b/c she had ulcers on her butt and she did not want diarrhea to get in it   - trulicity: patient developed hives from the medication, she states she had a hard time breathing and " in turn went tot [facility] for further assistance     SMBG: high 100-200 range    DISCUSSION:   - Patient is tolerating victoza without any issues, she notices her appetite is down significantly   - Patient is getting used to lantus, she states her sugars are much higher than they previously were which makes her anxious   - Reminded her we decreased the dose and plan to titrate it slowly back up    RECOMMENDATIONS/PLAN  1. Patients diabetes is poorly controlled with most recent A1c of 8.9%, down from 16.5% one month prior (goal < 7 %).   - Continue all meds under the continuation of care with the referring provider and clinical pharmacy team.    2. Future plans:   - re-trial SGLT2i pending kidney function stabilization     Clinical Pharmacist follow up: 1 week  - will speak with patient daily to titrate insulin as appropriate     Thank you,  Shandra Cuellar, PharmD    Verbal consent to manage patient's drug therapy was obtained from the patient. They were informed they may decline to participate or withdraw from participation in pharmacy services at any time.

## 2023-06-21 RX ORDER — FLASH GLUCOSE SENSOR
KIT MISCELLANEOUS
Qty: 2 EACH | Refills: 5 | Status: SHIPPED | OUTPATIENT
Start: 2023-06-21

## 2023-06-26 ENCOUNTER — TELEMEDICINE (OUTPATIENT)
Dept: PHARMACY | Facility: HOSPITAL | Age: 43
End: 2023-06-26
Payer: MEDICAID

## 2023-06-26 DIAGNOSIS — K21.9 GASTROESOPHAGEAL REFLUX DISEASE, UNSPECIFIED WHETHER ESOPHAGITIS PRESENT: ICD-10-CM

## 2023-06-26 DIAGNOSIS — E11.00 TYPE 2 DIABETES MELLITUS WITH HYPEROSMOLARITY WITHOUT COMA, WITHOUT LONG-TERM CURRENT USE OF INSULIN (MULTI): ICD-10-CM

## 2023-06-26 DIAGNOSIS — D50.9 IRON DEFICIENCY ANEMIA, UNSPECIFIED IRON DEFICIENCY ANEMIA TYPE: Primary | ICD-10-CM

## 2023-06-26 DIAGNOSIS — K21.9 GASTROESOPHAGEAL REFLUX DISEASE, UNSPECIFIED WHETHER ESOPHAGITIS PRESENT: Primary | ICD-10-CM

## 2023-06-26 DIAGNOSIS — I10 HYPERTENSION, UNSPECIFIED TYPE: ICD-10-CM

## 2023-06-26 DIAGNOSIS — E61.2 MAGNESIUM DEFICIENCY: ICD-10-CM

## 2023-06-26 RX ORDER — PANTOPRAZOLE SODIUM 40 MG/1
TABLET, DELAYED RELEASE ORAL
Qty: 30 TABLET | Refills: 2 | Status: SHIPPED | OUTPATIENT
Start: 2023-06-26 | End: 2023-09-05 | Stop reason: SDUPTHER

## 2023-06-26 RX ORDER — FERROUS SULFATE 325(65) MG
1 TABLET ORAL DAILY
Qty: 30 TABLET | Refills: 2 | Status: SHIPPED | OUTPATIENT
Start: 2023-06-26 | End: 2023-09-05 | Stop reason: SDUPTHER

## 2023-06-26 RX ORDER — LANOLIN ALCOHOL/MO/W.PET/CERES
1 CREAM (GRAM) TOPICAL 2 TIMES DAILY
Qty: 60 TABLET | Refills: 2 | Status: SHIPPED | OUTPATIENT
Start: 2023-06-26 | End: 2023-11-30 | Stop reason: SDUPTHER

## 2023-06-26 RX ORDER — CHLORTHALIDONE 25 MG/1
25 TABLET ORAL DAILY
Qty: 30 TABLET | Refills: 2 | Status: SHIPPED | OUTPATIENT
Start: 2023-06-26 | End: 2023-09-05 | Stop reason: SDUPTHER

## 2023-06-26 NOTE — PROGRESS NOTES
"Pharmacist Clinic: Diabetes Management  Jennifer Overton is a 42 y.o. female was referred to Clinical Pharmacy Team for diabetes management. At last visit, insulin was changed to once daily lantus.     Referring Provider: Christopher D'Amico, DO     HISTORY OF PRESENT ILLNESS   A1c one month ago of 16.5%, A1c 28 days later of 8.9%. Patient having frequent hypoglycemic events, one w/ losing consciousness and leading hospitalization.     LAB REVIEW   Glucose   Date Value   06/07/2023 267 mg/dL (H)   05/25/2023 CANCELED   05/24/2023 204 mg/dL (H)     Hemoglobin A1C (%)   Date Value   06/07/2023 8.9 (A)   05/09/2023 16.5 (H)   02/24/2022 9.6 (A)   04/02/2019 9.7     Bicarbonate   Date Value   06/07/2023 22 mmol/L   05/25/2023 CANCELED   05/24/2023 24 mmol/L     Urea Nitrogen   Date Value   06/07/2023 21 mg/dL   05/25/2023 CANCELED   05/24/2023 12 mg/dL     Creatinine   Date Value   06/07/2023 1.42 mg/dL (H)   05/25/2023 CANCELED   05/24/2023 1.74 mg/dL (H)     Lab Results   Component Value Date    HGBA1C 8.9 (A) 06/07/2023    HGBA1C 16.5 (H) 05/09/2023    HGBA1C 9.6 (A) 02/24/2022     Lab Results   Component Value Date    CHOL 267 (H) 06/07/2023    CHOL 170 02/24/2022    CHOL 183 04/02/2019     Lab Results   Component Value Date    HDL 52.4 06/07/2023    HDL 51.5 02/24/2022    HDL 47.6 04/02/2019     No results found for: \"LDLCALC\"  Lab Results   Component Value Date    TRIG 291 (H) 06/07/2023    TRIG 183 (H) 02/24/2022    TRIG 173 (H) 04/02/2019       DIABETES ASSESSMENT    CURRENT PHARMACOTHERAPY  - lantus 24 units once daily.   - victoza 0.6 mg under the skin once daily    SECONDARY PREVENTION  - Statin? Yes; rosuvastatin on med list  - ACE-I/ARB? Yes; olmesartan on med list    HISTORICAL PHARMACOTHERAPY  - metformin: patient stopped taking this b/c she had ulcers on her butt and she did not want diarrhea to get in it   - trulicity: patient developed hives from the medication, she states she had a hard time breathing and " in turn went tot [facility] for further assistance   - jardiance: patient never started the medication    SMBG: high 111-130    DISCUSSION:   - Patient increased her victoza to 1.2 mg and she became itchy again, since she kept her dose at 0.6 mg once daily  - Patient did not take her insulin all weekend, she said her numbers were between 111-130 and she was too scared  - Discussed insulin is something we either take every day or not at all, at this moment she does not want to take it at all    RECOMMENDATIONS/PLAN  1. Patients diabetes is poorly controlled with most recent A1c of 8.9%, down from 16.5% one month prior (goal < 7 %).   - Continue all meds under the continuation of care with the referring provider and clinical pharmacy team.   Patient is due for a refill on 4 medications, she has not taken them all weekend because she has been out.   - Hold insulin, we will have daily follow ups at 11 AM to discuss blood glucose readings.     2. Future plans:   - re-trial SGLT2i pending kidney function stabilization  - patient has been unsuccessful at scheduling with a nephrologist    Clinical Pharmacist follow up: daily    Thank you,  Shandra Cuellar, PharmD    Verbal consent to manage patient's drug therapy was obtained from the patient. They were informed they may decline to participate or withdraw from participation in pharmacy services at any time.

## 2023-06-30 DIAGNOSIS — E11.9 TYPE 2 DIABETES MELLITUS WITHOUT COMPLICATION, WITH LONG-TERM CURRENT USE OF INSULIN (MULTI): Primary | ICD-10-CM

## 2023-06-30 DIAGNOSIS — Z79.4 TYPE 2 DIABETES MELLITUS WITHOUT COMPLICATION, WITH LONG-TERM CURRENT USE OF INSULIN (MULTI): Primary | ICD-10-CM

## 2023-06-30 RX ORDER — GABAPENTIN 100 MG/1
100 CAPSULE ORAL EVERY 12 HOURS
Qty: 60 CAPSULE | Refills: 0 | Status: SHIPPED | OUTPATIENT
Start: 2023-06-30 | End: 2023-07-31 | Stop reason: SDUPTHER

## 2023-07-03 ENCOUNTER — TELEMEDICINE (OUTPATIENT)
Dept: PHARMACY | Facility: HOSPITAL | Age: 43
End: 2023-07-03
Payer: MEDICAID

## 2023-07-03 DIAGNOSIS — E11.00 TYPE 2 DIABETES MELLITUS WITH HYPEROSMOLARITY WITHOUT COMA, WITHOUT LONG-TERM CURRENT USE OF INSULIN (MULTI): ICD-10-CM

## 2023-07-03 DIAGNOSIS — D50.9 IRON DEFICIENCY ANEMIA, UNSPECIFIED IRON DEFICIENCY ANEMIA TYPE: Primary | ICD-10-CM

## 2023-07-03 NOTE — PROGRESS NOTES
"Pharmacist Clinic: Diabetes Management  Jennifer Overton is a 42 y.o. female was referred to Clinical Pharmacy Team for diabetes management. At last visit, patient stopped her insulin.     Referring Provider: Christopher D'Amico, DO     HISTORY OF PRESENT ILLNESS   A1c one month ago of 16.5%, A1c 28 days later of 8.9%. Patient having frequent hypoglycemic events, one w/ losing consciousness and leading hospitalization.     LAB REVIEW   Glucose   Date Value   06/07/2023 267 mg/dL (H)   05/25/2023 CANCELED   05/24/2023 204 mg/dL (H)     Hemoglobin A1C (%)   Date Value   06/07/2023 8.9 (A)   05/09/2023 16.5 (H)   02/24/2022 9.6 (A)   04/02/2019 9.7     Bicarbonate   Date Value   06/07/2023 22 mmol/L   05/25/2023 CANCELED   05/24/2023 24 mmol/L     Urea Nitrogen   Date Value   06/07/2023 21 mg/dL   05/25/2023 CANCELED   05/24/2023 12 mg/dL     Creatinine   Date Value   06/07/2023 1.42 mg/dL (H)   05/25/2023 CANCELED   05/24/2023 1.74 mg/dL (H)     Lab Results   Component Value Date    HGBA1C 8.9 (A) 06/07/2023    HGBA1C 16.5 (H) 05/09/2023    HGBA1C 9.6 (A) 02/24/2022     Lab Results   Component Value Date    CHOL 267 (H) 06/07/2023    CHOL 170 02/24/2022    CHOL 183 04/02/2019     Lab Results   Component Value Date    HDL 52.4 06/07/2023    HDL 51.5 02/24/2022    HDL 47.6 04/02/2019     No results found for: \"LDLCALC\"  Lab Results   Component Value Date    TRIG 291 (H) 06/07/2023    TRIG 183 (H) 02/24/2022    TRIG 173 (H) 04/02/2019       DIABETES ASSESSMENT    CURRENT PHARMACOTHERAPY  - victoza 0.6 mg under the skin once daily    SECONDARY PREVENTION  - Statin? Yes; rosuvastatin on med list  - ACE-I/ARB? Yes; olmesartan on med list    HISTORICAL PHARMACOTHERAPY  - metformin: patient stopped taking this b/c she had ulcers on her butt and she did not want diarrhea to get in it   - trulicity: patient developed hives from the medication, she states she had a hard time breathing and in turn went tot he hospital for further " assistance   - jardiance: patient never started the medication    SMBG:   - between 110-140 throughout the day    DISCUSSION:   - Patients blood glucose has been down since stopping insulin, she is using a CGM to check her sugars on a daily basis   - Patient is tolerating victoza, she is complaining of mild nausea, constipation and lack of appetite  - Patient is complaining of leg pain, she sates her legs feel shakey/tingly  - Patient is 2 days without gabapentin and tramadol, discussed contacting the doctors office/surgeons office for refill  - Went over the concept of neuropathy     RECOMMENDATIONS/PLAN  1. Patients diabetes is poorly controlled with most recent A1c of 8.9%, down from 16.5% one month prior (goal < 7 %).   - Continue all meds under the continuation of care with the referring provider and clinical pharmacy team.  - Follow up in 1 week for blood glucose readings and side effect management if necessary.     2. Future plans:   - re-trial SGLT2i pending kidney function stabilization  - patient has been unsuccessful at scheduling with a nephrologist    Clinical Pharmacist follow up: one week  - patient to call if her BG rises above goal     Thank you,  Shandra Cuellar, PharmD    Verbal consent to manage patient's drug therapy was obtained from the patient. They were informed they may decline to participate or withdraw from participation in pharmacy services at any time.

## 2023-07-31 DIAGNOSIS — E11.9 TYPE 2 DIABETES MELLITUS WITHOUT COMPLICATION, WITH LONG-TERM CURRENT USE OF INSULIN (MULTI): ICD-10-CM

## 2023-07-31 DIAGNOSIS — Z79.4 TYPE 2 DIABETES MELLITUS WITHOUT COMPLICATION, WITH LONG-TERM CURRENT USE OF INSULIN (MULTI): ICD-10-CM

## 2023-07-31 DIAGNOSIS — N18.9 CHRONIC KIDNEY DISEASE, UNSPECIFIED CKD STAGE: Primary | ICD-10-CM

## 2023-07-31 DIAGNOSIS — E55.9 VITAMIN D DEFICIENCY: ICD-10-CM

## 2023-07-31 RX ORDER — ERGOCALCIFEROL 1.25 MG/1
50000 CAPSULE ORAL
Qty: 12 CAPSULE | Refills: 0 | Status: SHIPPED | OUTPATIENT
Start: 2023-07-31 | End: 2023-10-23

## 2023-07-31 RX ORDER — GABAPENTIN 100 MG/1
100 CAPSULE ORAL EVERY 12 HOURS
Qty: 60 CAPSULE | Refills: 0 | Status: SHIPPED | OUTPATIENT
Start: 2023-07-31 | End: 2023-08-30 | Stop reason: SDUPTHER

## 2023-07-31 RX ORDER — SODIUM BICARBONATE 650 MG/1
650 TABLET ORAL 3 TIMES DAILY
Qty: 90 TABLET | Refills: 1 | Status: SHIPPED | OUTPATIENT
Start: 2023-07-31 | End: 2023-09-29 | Stop reason: SDUPTHER

## 2023-08-08 ENCOUNTER — TELEMEDICINE (OUTPATIENT)
Dept: PHARMACY | Facility: HOSPITAL | Age: 43
End: 2023-08-08
Payer: MEDICAID

## 2023-08-08 DIAGNOSIS — E11.00 TYPE 2 DIABETES MELLITUS WITH HYPEROSMOLARITY WITHOUT COMA, WITHOUT LONG-TERM CURRENT USE OF INSULIN (MULTI): Primary | ICD-10-CM

## 2023-08-08 RX ORDER — AMLODIPINE AND OLMESARTAN MEDOXOMIL 10; 40 MG/1; MG/1
1 TABLET ORAL DAILY
Qty: 30 TABLET | Refills: 1 | Status: SHIPPED | OUTPATIENT
Start: 2023-08-08 | End: 2023-09-12 | Stop reason: SDUPTHER

## 2023-08-08 NOTE — PROGRESS NOTES
"Pharmacist Clinic: Diabetes Management  Jennifer Overton is a 43 y.o. female was referred to Clinical Pharmacy Team for diabetes management. At last visit, no medication changes were made.     Referring Provider: Christopher D'Amico, DO     HISTORY OF PRESENT ILLNESS   A1c one month ago of 16.5%, A1c 28 days later of 8.9%. Patient having frequent hypoglycemic events, one w/ losing consciousness and leading hospitalization.     LAB REVIEW   Glucose   Date Value   06/07/2023 267 mg/dL (H)   05/25/2023 CANCELED   05/24/2023 204 mg/dL (H)     Hemoglobin A1C (%)   Date Value   06/07/2023 8.9 (A)   05/09/2023 16.5 (H)   02/24/2022 9.6 (A)   04/02/2019 9.7     Bicarbonate   Date Value   06/07/2023 22 mmol/L   05/25/2023 CANCELED   05/24/2023 24 mmol/L     Urea Nitrogen   Date Value   06/07/2023 21 mg/dL   05/25/2023 CANCELED   05/24/2023 12 mg/dL     Creatinine   Date Value   06/07/2023 1.42 mg/dL (H)   05/25/2023 CANCELED   05/24/2023 1.74 mg/dL (H)     Lab Results   Component Value Date    HGBA1C 8.9 (A) 06/07/2023    HGBA1C 16.5 (H) 05/09/2023    HGBA1C 9.6 (A) 02/24/2022     Lab Results   Component Value Date    CHOL 267 (H) 06/07/2023    CHOL 170 02/24/2022    CHOL 183 04/02/2019     Lab Results   Component Value Date    HDL 52.4 06/07/2023    HDL 51.5 02/24/2022    HDL 47.6 04/02/2019     No results found for: \"LDLCALC\"  Lab Results   Component Value Date    TRIG 291 (H) 06/07/2023    TRIG 183 (H) 02/24/2022    TRIG 173 (H) 04/02/2019       DIABETES ASSESSMENT    CURRENT PHARMACOTHERAPY  - victoza 0.6 mg under the skin once daily; patient increased it and experienced N/V    SECONDARY PREVENTION  - Statin? Yes; rosuvastatin on med list  - ACE-I/ARB? Yes; olmesartan on med list    HISTORICAL PHARMACOTHERAPY  - metformin: patient stopped taking this b/c she had ulcers on her butt and she did not want diarrhea to get in it   - trulicity: patient developed hives from the medication, she states she had a hard time breathing and " in turn went tot [facility] for further assistance   - jardiance: patient never started the medication    SMBG:   - between 110-140 throughout the day    DISCUSSION:   - Patients blood glucose has been down since stopping insulin, she is using a CGM to check her sugars on a daily basis   - Patient is tolerating victoza, she is complaining of mild nausea, constipation and lack of appetite, overall she is tolerating the low dose of the medication and does not want to increase it again at this time   - Patient is 2 days without her BP medication amlodipine/olmesartan 10/40 mg, she is out of refills from her cardiologist     RECOMMENDATIONS/PLAN  1. Patients diabetes is poorly controlled with most recent A1c of 8.9%, down from 16.5% one month prior (goal < 7 %).   - Continue all meds under the continuation of care with the referring provider and clinical pharmacy team.     2. Future considerations:    - re-trial SGLT2i pending kidney function stabilization  - patient has been unsuccessful at scheduling with a nephrologist    Clinical Pharmacist follow up: two weeks  - patient to call if her BG rises above goal     Thank you,  Shandra Cuellar, PharmD    Verbal consent to manage patient's drug therapy was obtained from the patient. They were informed they may decline to participate or withdraw from participation in pharmacy services at any time.

## 2023-08-22 ENCOUNTER — TELEMEDICINE (OUTPATIENT)
Dept: PHARMACY | Facility: HOSPITAL | Age: 43
End: 2023-08-22
Payer: MEDICAID

## 2023-08-22 DIAGNOSIS — E11.00 TYPE 2 DIABETES MELLITUS WITH HYPEROSMOLARITY WITHOUT COMA, WITHOUT LONG-TERM CURRENT USE OF INSULIN (MULTI): Primary | ICD-10-CM

## 2023-08-22 NOTE — PROGRESS NOTES
"Pharmacist Clinic: Diabetes Management  Jennifer Overton is a 43 y.o. female was referred to Clinical Pharmacy Team for diabetes management. At last visit, no medication changes were made.     Referring Provider: Christopher D'Amico, DO     HISTORY OF PRESENT ILLNESS   A1c one month ago of 16.5%, A1c 28 days later of 8.9%. Patient having frequent hypoglycemic events, one w/ losing consciousness and leading hospitalization.     LAB REVIEW   Glucose   Date Value   06/07/2023 267 mg/dL (H)   05/25/2023 CANCELED   05/24/2023 204 mg/dL (H)     Hemoglobin A1C (%)   Date Value   06/07/2023 8.9 (A)   05/09/2023 16.5 (H)   02/24/2022 9.6 (A)   04/02/2019 9.7     Bicarbonate   Date Value   06/07/2023 22 mmol/L   05/25/2023 CANCELED   05/24/2023 24 mmol/L     Urea Nitrogen   Date Value   06/07/2023 21 mg/dL   05/25/2023 CANCELED   05/24/2023 12 mg/dL     Creatinine   Date Value   06/07/2023 1.42 mg/dL (H)   05/25/2023 CANCELED   05/24/2023 1.74 mg/dL (H)     Lab Results   Component Value Date    HGBA1C 8.9 (A) 06/07/2023    HGBA1C 16.5 (H) 05/09/2023    HGBA1C 9.6 (A) 02/24/2022     Lab Results   Component Value Date    CHOL 267 (H) 06/07/2023    CHOL 170 02/24/2022    CHOL 183 04/02/2019     Lab Results   Component Value Date    HDL 52.4 06/07/2023    HDL 51.5 02/24/2022    HDL 47.6 04/02/2019     No results found for: \"LDLCALC\"  Lab Results   Component Value Date    TRIG 291 (H) 06/07/2023    TRIG 183 (H) 02/24/2022    TRIG 173 (H) 04/02/2019       DIABETES ASSESSMENT    CURRENT PHARMACOTHERAPY  - victoza 0.6 mg under the skin once daily; patient increased it and experienced N/V    SECONDARY PREVENTION  - Statin? Yes; rosuvastatin on med list  - ACE-I/ARB? Yes; olmesartan on med list    HISTORICAL PHARMACOTHERAPY  - metformin: patient stopped taking this b/c she had ulcers on her butt and she did not want diarrhea to get in it   - trulicity: patient developed hives from the medication, she states she had a hard time breathing and " in turn went tot [facility] for further assistance   - jardiance: patient never started the medication    SMBG:   - between 110-140 throughout the day    DISCUSSION:   - Patients blood glucose is still in range, she is feeling good about her numbers   - We discussed getting more lab work so we can know what our next treatment steps are (jardiance or metformin)     RECOMMENDATIONS/PLAN  1. Patients diabetes is poorly controlled with most recent A1c of 8.9%, down from 16.5% one month prior (goal < 7 %).   - Continue all meds under the continuation of care with the referring provider and clinical pharmacy team.     2. Future considerations:    - re-trial SGLT2i pending kidney function stabilization  - patient has been unsuccessful at scheduling with a nephrologist    Clinical Pharmacist follow up: 1 week  - patient to call if her BG rises above goal     Thank you,  Shandra Cuellar, PharmD    Verbal consent to manage patient's drug therapy was obtained from the patient. They were informed they may decline to participate or withdraw from participation in pharmacy services at any time.

## 2023-08-25 ENCOUNTER — APPOINTMENT (OUTPATIENT)
Dept: PHARMACY | Facility: HOSPITAL | Age: 43
End: 2023-08-25
Payer: MEDICAID

## 2023-08-25 ENCOUNTER — LAB (OUTPATIENT)
Dept: LAB | Facility: LAB | Age: 43
End: 2023-08-25
Payer: MEDICAID

## 2023-08-25 DIAGNOSIS — E55.9 VITAMIN D DEFICIENCY: ICD-10-CM

## 2023-08-25 DIAGNOSIS — E11.00 TYPE 2 DIABETES MELLITUS WITH HYPEROSMOLARITY WITHOUT COMA, WITHOUT LONG-TERM CURRENT USE OF INSULIN (MULTI): ICD-10-CM

## 2023-08-25 LAB
ALANINE AMINOTRANSFERASE (SGPT) (U/L) IN SER/PLAS: 9 U/L (ref 7–45)
ALBUMIN (G/DL) IN SER/PLAS: 4.4 G/DL (ref 3.4–5)
ALBUMIN (G/DL) IN SER/PLAS: 4.4 G/DL (ref 3.4–5)
ALKALINE PHOSPHATASE (U/L) IN SER/PLAS: 42 U/L (ref 33–110)
ANION GAP IN SER/PLAS: 17 MMOL/L (ref 10–20)
ANION GAP IN SER/PLAS: 19 MMOL/L (ref 10–20)
APPEARANCE, URINE: NORMAL
ASCORBIC ACID: NORMAL MG/DL
ASPARTATE AMINOTRANSFERASE (SGOT) (U/L) IN SER/PLAS: 12 U/L (ref 9–39)
BILIRUBIN TOTAL (MG/DL) IN SER/PLAS: 0.3 MG/DL (ref 0–1.2)
BILIRUBIN, URINE: NORMAL
BLOOD, URINE: NORMAL
CALCIDIOL (25 OH VITAMIN D3) (NG/ML) IN SER/PLAS: 56 NG/ML
CALCIUM (MG/DL) IN SER/PLAS: 10 MG/DL (ref 8.6–10.6)
CALCIUM (MG/DL) IN SER/PLAS: 9.8 MG/DL (ref 8.6–10.6)
CARBON DIOXIDE, TOTAL (MMOL/L) IN SER/PLAS: 21 MMOL/L (ref 21–32)
CARBON DIOXIDE, TOTAL (MMOL/L) IN SER/PLAS: 22 MMOL/L (ref 21–32)
CHLORIDE (MMOL/L) IN SER/PLAS: 100 MMOL/L (ref 98–107)
CHLORIDE (MMOL/L) IN SER/PLAS: 101 MMOL/L (ref 98–107)
COLOR, URINE: NORMAL
CREATININE (MG/DL) IN SER/PLAS: 1.47 MG/DL (ref 0.5–1.05)
CREATININE (MG/DL) IN SER/PLAS: 1.49 MG/DL (ref 0.5–1.05)
ERYTHROCYTE DISTRIBUTION WIDTH (RATIO) BY AUTOMATED COUNT: 13.5 % (ref 11.5–14.5)
ERYTHROCYTE MEAN CORPUSCULAR HEMOGLOBIN CONCENTRATION (G/DL) BY AUTOMATED: 33 G/DL (ref 32–36)
ERYTHROCYTE MEAN CORPUSCULAR VOLUME (FL) BY AUTOMATED COUNT: 93 FL (ref 80–100)
ERYTHROCYTES (10*6/UL) IN BLOOD BY AUTOMATED COUNT: 3.75 X10E12/L (ref 4–5.2)
ESTIMATED AVERAGE GLUCOSE FOR HBA1C: 192 MG/DL
FERRITIN (UG/LL) IN SER/PLAS: 48 UG/L (ref 8–150)
GFR FEMALE: 44 ML/MIN/1.73M2
GFR FEMALE: 45 ML/MIN/1.73M2
GLUCOSE (MG/DL) IN SER/PLAS: 199 MG/DL (ref 74–99)
GLUCOSE (MG/DL) IN SER/PLAS: 206 MG/DL (ref 74–99)
GLUCOSE, URINE: NORMAL
HEMATOCRIT (%) IN BLOOD BY AUTOMATED COUNT: 34.8 % (ref 36–46)
HEMOGLOBIN (G/DL) IN BLOOD: 11.5 G/DL (ref 12–16)
HEMOGLOBIN A1C/HEMOGLOBIN TOTAL IN BLOOD: 8.3 %
IRON (UG/DL) IN SER/PLAS: 53 UG/DL (ref 35–150)
IRON BINDING CAPACITY (UG/DL) IN SER/PLAS: 371 UG/DL (ref 240–445)
IRON SATURATION (%) IN SER/PLAS: 14 % (ref 25–45)
KETONES, URINE: NORMAL
LEUKOCYTE ESTERASE, URINE: NORMAL
LEUKOCYTES (10*3/UL) IN BLOOD BY AUTOMATED COUNT: 4.9 X10E9/L (ref 4.4–11.3)
MAGNESIUM (MG/DL) IN SER/PLAS: 2.16 MG/DL (ref 1.6–2.4)
NITRITE, URINE: NORMAL
NRBC (PER 100 WBCS) BY AUTOMATED COUNT: 0 /100 WBC (ref 0–0)
PARATHYRIN INTACT (PG/ML) IN SER/PLAS: 55.8 PG/ML (ref 18.5–88)
PH, URINE: NORMAL
PHOSPHATE (MG/DL) IN SER/PLAS: 4.5 MG/DL (ref 2.5–4.9)
PLATELETS (10*3/UL) IN BLOOD AUTOMATED COUNT: 181 X10E9/L (ref 150–450)
POTASSIUM (MMOL/L) IN SER/PLAS: 3.8 MMOL/L (ref 3.5–5.3)
POTASSIUM (MMOL/L) IN SER/PLAS: 3.8 MMOL/L (ref 3.5–5.3)
PROTEIN TOTAL: 8.3 G/DL (ref 6.4–8.2)
PROTEIN, URINE: NORMAL
SODIUM (MMOL/L) IN SER/PLAS: 136 MMOL/L (ref 136–145)
SODIUM (MMOL/L) IN SER/PLAS: 136 MMOL/L (ref 136–145)
SPECIFIC GRAVITY, URINE: NORMAL
URATE (MG/DL) IN SER/PLAS: 8.6 MG/DL (ref 2.3–6.7)
UREA NITROGEN (MG/DL) IN SER/PLAS: 27 MG/DL (ref 6–23)
UREA NITROGEN (MG/DL) IN SER/PLAS: 28 MG/DL (ref 6–23)
UROBILINOGEN, URINE: NORMAL

## 2023-08-25 PROCEDURE — 83036 HEMOGLOBIN GLYCOSYLATED A1C: CPT

## 2023-08-25 PROCEDURE — 36415 COLL VENOUS BLD VENIPUNCTURE: CPT

## 2023-08-25 PROCEDURE — 80053 COMPREHEN METABOLIC PANEL: CPT

## 2023-08-25 PROCEDURE — 82306 VITAMIN D 25 HYDROXY: CPT

## 2023-08-26 LAB — CALCIDIOL (25 OH VITAMIN D3) (NG/ML) IN SER/PLAS: 54 NG/ML

## 2023-08-28 ENCOUNTER — TELEMEDICINE (OUTPATIENT)
Dept: PHARMACY | Facility: HOSPITAL | Age: 43
End: 2023-08-28
Payer: MEDICAID

## 2023-08-28 DIAGNOSIS — E11.00 TYPE 2 DIABETES MELLITUS WITH HYPEROSMOLARITY WITHOUT COMA, WITHOUT LONG-TERM CURRENT USE OF INSULIN (MULTI): Primary | ICD-10-CM

## 2023-08-28 RX ORDER — DAPAGLIFLOZIN 10 MG/1
10 TABLET, FILM COATED ORAL DAILY
Qty: 30 TABLET | Refills: 5 | Status: SHIPPED | OUTPATIENT
Start: 2023-08-28 | End: 2024-02-26 | Stop reason: SDUPTHER

## 2023-08-28 NOTE — PROGRESS NOTES
"Pharmacist Clinic: Diabetes Management  Jennifer Overton is a 43 y.o. female was referred to Clinical Pharmacy Team for diabetes management. At last visit, no medication changes were made. However, since last visit, patient got her lab work done.     Referring Provider: Christopher D'Amico, DO     HISTORY OF PRESENT ILLNESS   A1c one month ago of 16.5%, A1c 28 days later of 8.9%. Patient having frequent hypoglycemic events, one w/ losing consciousness and leading hospitalization. Since last hospitalization, patient stopped using insulin and we started her on victoza 0.6 mg. She is not interested in increasing victoza at this time.     LAB REVIEW   Glucose (mg/dL)   Date Value   08/25/2023 206 (H)   08/25/2023 199 (H)   06/07/2023 267 (H)     Hemoglobin A1C (%)   Date Value   08/25/2023 8.3 (A)   06/07/2023 8.9 (A)   05/09/2023 16.5 (H)   02/24/2022 9.6 (A)     Bicarbonate (mmol/L)   Date Value   08/25/2023 21   08/25/2023 22   06/07/2023 22     Urea Nitrogen (mg/dL)   Date Value   08/25/2023 28 (H)   08/25/2023 27 (H)   06/07/2023 21     Creatinine (mg/dL)   Date Value   08/25/2023 1.47 (H)   08/25/2023 1.49 (H)   06/07/2023 1.42 (H)     Lab Results   Component Value Date    HGBA1C 8.3 (A) 08/25/2023    HGBA1C 8.9 (A) 06/07/2023    HGBA1C 16.5 (H) 05/09/2023     Lab Results   Component Value Date    CHOL 267 (H) 06/07/2023    CHOL 170 02/24/2022    CHOL 183 04/02/2019     Lab Results   Component Value Date    HDL 52.4 06/07/2023    HDL 51.5 02/24/2022    HDL 47.6 04/02/2019     No results found for: \"LDLCALC\"  Lab Results   Component Value Date    TRIG 291 (H) 06/07/2023    TRIG 183 (H) 02/24/2022    TRIG 173 (H) 04/02/2019       DIABETES ASSESSMENT    CURRENT PHARMACOTHERAPY  - victoza 0.6 mg under the skin once daily; patient increased it and experienced total body itchiness     SECONDARY PREVENTION  - Statin? Yes; rosuvastatin on med list  - ACE-I/ARB? Yes; olmesartan on med list    HISTORICAL PHARMACOTHERAPY  - " metformin: patient stopped taking this b/c she had ulcers on her butt and she did not want diarrhea to get in it   - trulicity: patient developed hives from the medication, she states she had a hard time breathing and in turn went tot he hospital for further assistance   - jardiance: patient never started the medication    SMBG:   - between 110-140 throughout the day    DISCUSSION:   - Patients A1c is still elevated, however we should be encouraged because it has continued to go down even after stopping insulin   - Patient is not interested in increasing victoza at this time, it makes her itchy   - Patient is still constipated, discussed miralax and watching what she eats, if she is not eating as much she may not have regular bowel movements   - Plan to start Farxiga, went over MOA, expectations, side effects, etc.     RECOMMENDATIONS/PLAN  1. Patients diabetes is poorly controlled with most recent A1c of 8.9%, down from 16.5% one month prior (goal < 7 %).   - Continue all meds under the continuation of care with the referring provider and clinical pharmacy team.   - Initiate farxiga 10 mg once daily.    Clinical Pharmacist follow up: 1 week    Thank you,  Shandra Cuellar, PharmD    Verbal consent to manage patient's drug therapy was obtained from the patient. They were informed they may decline to participate or withdraw from participation in pharmacy services at any time.

## 2023-08-29 NOTE — RESULT ENCOUNTER NOTE
Chronic kidney disease is stable, would highly recommend starting Jardiance or Farxiga with the pharmacist, as it is both a diabetic medication, and medication proven to help with chronic kidney disease.    A1c at 8.3, which is continuing to show some improvement, but looks to have leveled off.  Again, the additional medication as recommended above and increasing Victoza with the pharmacist is recommended.    Remaining labs unremarkable.

## 2023-08-30 DIAGNOSIS — Z79.4 TYPE 2 DIABETES MELLITUS WITHOUT COMPLICATION, WITH LONG-TERM CURRENT USE OF INSULIN (MULTI): ICD-10-CM

## 2023-08-30 DIAGNOSIS — E78.5 HYPERLIPIDEMIA, UNSPECIFIED HYPERLIPIDEMIA TYPE: ICD-10-CM

## 2023-08-30 DIAGNOSIS — E11.9 TYPE 2 DIABETES MELLITUS WITHOUT COMPLICATION, WITH LONG-TERM CURRENT USE OF INSULIN (MULTI): ICD-10-CM

## 2023-08-30 RX ORDER — ROSUVASTATIN CALCIUM 20 MG/1
20 TABLET, COATED ORAL DAILY
Qty: 30 TABLET | Refills: 2 | Status: SHIPPED | OUTPATIENT
Start: 2023-08-30 | End: 2024-02-27 | Stop reason: SDUPTHER

## 2023-08-30 RX ORDER — GABAPENTIN 100 MG/1
100 CAPSULE ORAL EVERY 12 HOURS
Qty: 60 CAPSULE | Refills: 0 | Status: SHIPPED | OUTPATIENT
Start: 2023-08-30 | End: 2023-09-29 | Stop reason: SDUPTHER

## 2023-09-05 DIAGNOSIS — D50.9 IRON DEFICIENCY ANEMIA, UNSPECIFIED IRON DEFICIENCY ANEMIA TYPE: ICD-10-CM

## 2023-09-05 DIAGNOSIS — I10 HYPERTENSION, UNSPECIFIED TYPE: ICD-10-CM

## 2023-09-05 DIAGNOSIS — K21.9 GASTROESOPHAGEAL REFLUX DISEASE, UNSPECIFIED WHETHER ESOPHAGITIS PRESENT: ICD-10-CM

## 2023-09-05 RX ORDER — FERROUS SULFATE 325(65) MG
1 TABLET ORAL DAILY
Qty: 30 TABLET | Refills: 2 | Status: SHIPPED | OUTPATIENT
Start: 2023-09-05 | End: 2023-11-28 | Stop reason: SDUPTHER

## 2023-09-05 RX ORDER — PANTOPRAZOLE SODIUM 40 MG/1
TABLET, DELAYED RELEASE ORAL
Qty: 30 TABLET | Refills: 2 | Status: SHIPPED | OUTPATIENT
Start: 2023-09-05 | End: 2024-01-09 | Stop reason: SDUPTHER

## 2023-09-05 RX ORDER — CHLORTHALIDONE 25 MG/1
25 TABLET ORAL DAILY
Qty: 30 TABLET | Refills: 2 | Status: SHIPPED | OUTPATIENT
Start: 2023-09-05 | End: 2023-11-28 | Stop reason: SDUPTHER

## 2023-09-12 ENCOUNTER — OFFICE VISIT (OUTPATIENT)
Dept: PRIMARY CARE | Facility: CLINIC | Age: 43
End: 2023-09-12
Payer: MEDICAID

## 2023-09-12 VITALS
DIASTOLIC BLOOD PRESSURE: 94 MMHG | SYSTOLIC BLOOD PRESSURE: 150 MMHG | BODY MASS INDEX: 27.46 KG/M2 | OXYGEN SATURATION: 100 % | HEART RATE: 69 BPM | HEIGHT: 63 IN | WEIGHT: 155 LBS

## 2023-09-12 DIAGNOSIS — E11.00 TYPE 2 DIABETES MELLITUS WITH HYPEROSMOLARITY WITHOUT COMA, WITHOUT LONG-TERM CURRENT USE OF INSULIN (MULTI): Primary | ICD-10-CM

## 2023-09-12 DIAGNOSIS — R10.9 ABDOMINAL PAIN, UNSPECIFIED ABDOMINAL LOCATION: ICD-10-CM

## 2023-09-12 DIAGNOSIS — N18.30 STAGE 3 CHRONIC KIDNEY DISEASE, UNSPECIFIED WHETHER STAGE 3A OR 3B CKD (MULTI): ICD-10-CM

## 2023-09-12 DIAGNOSIS — I10 HYPERTENSION, UNSPECIFIED TYPE: ICD-10-CM

## 2023-09-12 DIAGNOSIS — I11.0 HYPERTENSIVE HEART DISEASE WITH HEART FAILURE (MULTI): ICD-10-CM

## 2023-09-12 DIAGNOSIS — K59.00 CONSTIPATION, UNSPECIFIED CONSTIPATION TYPE: ICD-10-CM

## 2023-09-12 PROBLEM — R76.8 ELEVATED ANTINUCLEAR ANTIBODY (ANA) LEVEL: Status: ACTIVE | Noted: 2023-09-12

## 2023-09-12 PROBLEM — L02.31 GLUTEAL ABSCESS: Status: ACTIVE | Noted: 2023-05-09

## 2023-09-12 PROBLEM — D47.2 MONOCLONAL GAMMOPATHY: Status: ACTIVE | Noted: 2023-09-12

## 2023-09-12 PROBLEM — E10.10 DIABETIC KETOACIDOSIS WITHOUT COMA ASSOCIATED WITH TYPE 1 DIABETES MELLITUS (MULTI): Status: ACTIVE | Noted: 2023-05-09

## 2023-09-12 PROBLEM — E83.42 HYPOMAGNESEMIA: Status: ACTIVE | Noted: 2023-09-12

## 2023-09-12 PROBLEM — E87.20 ACIDOSIS, UNSPECIFIED: Status: ACTIVE | Noted: 2023-09-12

## 2023-09-12 PROCEDURE — 99214 OFFICE O/P EST MOD 30 MIN: CPT | Performed by: STUDENT IN AN ORGANIZED HEALTH CARE EDUCATION/TRAINING PROGRAM

## 2023-09-12 PROCEDURE — 3077F SYST BP >= 140 MM HG: CPT | Performed by: STUDENT IN AN ORGANIZED HEALTH CARE EDUCATION/TRAINING PROGRAM

## 2023-09-12 PROCEDURE — 3080F DIAST BP >= 90 MM HG: CPT | Performed by: STUDENT IN AN ORGANIZED HEALTH CARE EDUCATION/TRAINING PROGRAM

## 2023-09-12 PROCEDURE — 3052F HG A1C>EQUAL 8.0%<EQUAL 9.0%: CPT | Performed by: STUDENT IN AN ORGANIZED HEALTH CARE EDUCATION/TRAINING PROGRAM

## 2023-09-12 PROCEDURE — 3066F NEPHROPATHY DOC TX: CPT | Performed by: STUDENT IN AN ORGANIZED HEALTH CARE EDUCATION/TRAINING PROGRAM

## 2023-09-12 RX ORDER — AMLODIPINE AND OLMESARTAN MEDOXOMIL 10; 40 MG/1; MG/1
1 TABLET ORAL DAILY
Qty: 90 TABLET | Refills: 1 | Status: SHIPPED | OUTPATIENT
Start: 2023-09-12 | End: 2024-03-10

## 2023-09-12 RX ORDER — INSULIN GLARGINE 100 [IU]/ML
INJECTION, SOLUTION SUBCUTANEOUS
COMMUNITY
Start: 2023-06-17

## 2023-09-12 RX ORDER — ATENOLOL AND CHLORTHALIDONE TABLET 100; 25 MG/1; MG/1
1 TABLET ORAL DAILY
COMMUNITY
Start: 2023-08-04 | End: 2024-01-29 | Stop reason: SDUPTHER

## 2023-09-12 RX ORDER — VIT C/E/ZN/COPPR/LUTEIN/ZEAXAN 250MG-90MG
1000 CAPSULE ORAL
COMMUNITY
Start: 2023-07-08

## 2023-09-12 RX ORDER — INSULIN LISPRO 100 [IU]/ML
INJECTION, SOLUTION INTRAVENOUS; SUBCUTANEOUS
COMMUNITY

## 2023-09-12 ASSESSMENT — ENCOUNTER SYMPTOMS
LOSS OF SENSATION IN FEET: 0
DEPRESSION: 0
OCCASIONAL FEELINGS OF UNSTEADINESS: 0

## 2023-09-12 ASSESSMENT — COLUMBIA-SUICIDE SEVERITY RATING SCALE - C-SSRS
2. HAVE YOU ACTUALLY HAD ANY THOUGHTS OF KILLING YOURSELF?: NO
6. HAVE YOU EVER DONE ANYTHING, STARTED TO DO ANYTHING, OR PREPARED TO DO ANYTHING TO END YOUR LIFE?: NO
1. IN THE PAST MONTH, HAVE YOU WISHED YOU WERE DEAD OR WISHED YOU COULD GO TO SLEEP AND NOT WAKE UP?: NO

## 2023-09-12 NOTE — PROGRESS NOTES
43-year-old female presenting for follow-up on multiple conditions:    Hypoglycemia, DMII  Has been using freestyle federico, follow with [facility] pharmacist.  Blood sugars have improved a lot on Victoza first dose.  Did try to increase dosing once, get itchy, went back to original dose.  Started Farxiga last week.  Tolerating well.    CKD  Following with nephrology    Anemia  Stable    Constipation, abdominal pain  Reports she has not had a bowel movement in about a week.  Has not had this level of constipation before, but does have transient abdominal pain since discharge.  Unclear whether it aligned with GLP-1 initiation.  Is still eating and drinking, denies any vomiting.  Has taken MiraLAX, but discontinued when it did not work.    HTN  Stable, tolerates current regimen well.  Has been out of medication for 2 weeks, states she was denied refills.  Denies chest pain/shortness of breath.    12 point ROS reviewed and negative other than as stated in HPI    General: Alert, oriented, pleasant, in no acute distress  HEENT:      Head: normocephalic, atraumatic;      eyes: EOMI, no scleral icterus;   CV: Heart with regular rate and rhythm, normal S1/S2, no murmurs  Lungs: CTAB without wheezing, rhonchi or rales; good respiratory effort, no increased work of breathing  Abdomen: soft, nondistended, mild to moderate tenderness with diffuse palpation, no rebound tenderness  Extremities: no edema, no cyanosis  Neuro: Cranial nerves grossly intact; alert and oriented  Psych: Appropriate mood and affect    1.  Hypoglycemia 2. DMII  - Significant improvement, using freestyle federico and staying at goal  - Has been doing well with Victoza, last A1c down from 16.5-8.3  - Started Farxiga 10 mg daily about a week ago    3.  CKD  - GFR stable, following with nephrology    4.  Anemia  - Stable CBCs    5.  Constipation, abdominal pain  -Not currently having any vomiting episodes, still eating  - Concern for symptoms secondary to GLP-1 usage,  or diabetic sequelae  - KUB  - Advised to take MiraLAX up to 3 times a day until bowel movement  - Will be put on Metamucil daily  - Can consider decreasing Victoza, possibly every other day, or considering different medication option  -Close follow-up    6.  HTN  - Above goal in office, did not get refills on medication  - Refill amlodipine-olmesartan 10-40 mg daily  -Patient needs med rec with pharmacy to see if she is taking other medication like atenolol-chlorthalidone, patient unsure    Follow-up 3 months, needs CPE Christopher D'Amico, DO

## 2023-09-28 DIAGNOSIS — E11.00 TYPE 2 DIABETES MELLITUS WITH HYPEROSMOLARITY WITHOUT COMA, WITHOUT LONG-TERM CURRENT USE OF INSULIN (MULTI): ICD-10-CM

## 2023-09-28 RX ORDER — LIRAGLUTIDE 6 MG/ML
INJECTION SUBCUTANEOUS
Qty: 9 ML | Refills: 2 | Status: SHIPPED | OUTPATIENT
Start: 2023-09-28

## 2023-09-29 DIAGNOSIS — E11.9 TYPE 2 DIABETES MELLITUS WITHOUT COMPLICATION, WITH LONG-TERM CURRENT USE OF INSULIN (MULTI): ICD-10-CM

## 2023-09-29 DIAGNOSIS — Z79.4 TYPE 2 DIABETES MELLITUS WITHOUT COMPLICATION, WITH LONG-TERM CURRENT USE OF INSULIN (MULTI): ICD-10-CM

## 2023-09-29 DIAGNOSIS — N18.9 CHRONIC KIDNEY DISEASE, UNSPECIFIED CKD STAGE: ICD-10-CM

## 2023-09-29 RX ORDER — GABAPENTIN 100 MG/1
100 CAPSULE ORAL EVERY 12 HOURS
Qty: 60 CAPSULE | Refills: 0 | Status: SHIPPED | OUTPATIENT
Start: 2023-09-29 | End: 2023-10-30 | Stop reason: SDUPTHER

## 2023-09-29 RX ORDER — SODIUM BICARBONATE 650 MG/1
650 TABLET ORAL 3 TIMES DAILY
Qty: 90 TABLET | Refills: 1 | Status: SHIPPED | OUTPATIENT
Start: 2023-09-29

## 2023-10-30 DIAGNOSIS — E11.9 TYPE 2 DIABETES MELLITUS WITHOUT COMPLICATION, WITH LONG-TERM CURRENT USE OF INSULIN (MULTI): ICD-10-CM

## 2023-10-30 DIAGNOSIS — Z79.4 TYPE 2 DIABETES MELLITUS WITHOUT COMPLICATION, WITH LONG-TERM CURRENT USE OF INSULIN (MULTI): ICD-10-CM

## 2023-10-30 RX ORDER — GABAPENTIN 100 MG/1
100 CAPSULE ORAL EVERY 12 HOURS
Qty: 60 CAPSULE | Refills: 0 | Status: SHIPPED | OUTPATIENT
Start: 2023-10-30 | End: 2024-02-27 | Stop reason: SDUPTHER

## 2023-11-28 DIAGNOSIS — E61.2 MAGNESIUM DEFICIENCY: ICD-10-CM

## 2023-11-28 DIAGNOSIS — D50.9 IRON DEFICIENCY ANEMIA, UNSPECIFIED IRON DEFICIENCY ANEMIA TYPE: ICD-10-CM

## 2023-11-28 DIAGNOSIS — I10 HYPERTENSION, UNSPECIFIED TYPE: ICD-10-CM

## 2023-11-30 RX ORDER — LANOLIN ALCOHOL/MO/W.PET/CERES
1 CREAM (GRAM) TOPICAL 2 TIMES DAILY
Qty: 60 TABLET | Refills: 3 | Status: SHIPPED | OUTPATIENT
Start: 2023-11-30 | End: 2024-03-27 | Stop reason: SDUPTHER

## 2023-11-30 RX ORDER — CHLORTHALIDONE 25 MG/1
25 TABLET ORAL DAILY
Qty: 30 TABLET | Refills: 2 | Status: SHIPPED | OUTPATIENT
Start: 2023-11-30 | End: 2024-02-26 | Stop reason: SDUPTHER

## 2023-11-30 RX ORDER — FERROUS SULFATE 325(65) MG
1 TABLET ORAL DAILY
Qty: 30 TABLET | Refills: 2 | Status: SHIPPED | OUTPATIENT
Start: 2023-11-30 | End: 2024-02-26 | Stop reason: SDUPTHER

## 2024-01-09 ENCOUNTER — LAB (OUTPATIENT)
Dept: LAB | Facility: LAB | Age: 44
End: 2024-01-09
Payer: MEDICAID

## 2024-01-09 ENCOUNTER — HOSPITAL ENCOUNTER (OUTPATIENT)
Dept: RADIOLOGY | Facility: CLINIC | Age: 44
Discharge: HOME | End: 2024-01-09
Payer: MEDICAID

## 2024-01-09 DIAGNOSIS — N18.30 CHRONIC KIDNEY DISEASE, STAGE 3 UNSPECIFIED (MULTI): ICD-10-CM

## 2024-01-09 DIAGNOSIS — K21.9 GASTROESOPHAGEAL REFLUX DISEASE, UNSPECIFIED WHETHER ESOPHAGITIS PRESENT: ICD-10-CM

## 2024-01-09 DIAGNOSIS — R10.9 ABDOMINAL PAIN, UNSPECIFIED ABDOMINAL LOCATION: ICD-10-CM

## 2024-01-09 DIAGNOSIS — E79.0 HYPERURICEMIA WITHOUT SIGNS OF INFLAMMATORY ARTHRITIS AND TOPHACEOUS DISEASE: ICD-10-CM

## 2024-01-09 DIAGNOSIS — D64.9 ANEMIA, UNSPECIFIED: Primary | ICD-10-CM

## 2024-01-09 DIAGNOSIS — E83.42 HYPOMAGNESEMIA: ICD-10-CM

## 2024-01-09 LAB
ALBUMIN SERPL BCP-MCNC: 4.3 G/DL (ref 3.4–5)
ANION GAP SERPL CALC-SCNC: 15 MMOL/L (ref 10–20)
APPEARANCE UR: ABNORMAL
BACTERIA #/AREA URNS AUTO: ABNORMAL /HPF
BILIRUB UR STRIP.AUTO-MCNC: NEGATIVE MG/DL
BUN SERPL-MCNC: 37 MG/DL (ref 6–23)
CALCIUM SERPL-MCNC: 9.1 MG/DL (ref 8.6–10.6)
CHLORIDE SERPL-SCNC: 103 MMOL/L (ref 98–107)
CO2 SERPL-SCNC: 24 MMOL/L (ref 21–32)
COLOR UR: YELLOW
CREAT SERPL-MCNC: 1.8 MG/DL (ref 0.5–1.05)
CREAT UR-MCNC: 125.6 MG/DL (ref 20–320)
EGFRCR SERPLBLD CKD-EPI 2021: 35 ML/MIN/1.73M*2
ERYTHROCYTE [DISTWIDTH] IN BLOOD BY AUTOMATED COUNT: 15.1 % (ref 11.5–14.5)
GLUCOSE SERPL-MCNC: 203 MG/DL (ref 74–99)
GLUCOSE UR STRIP.AUTO-MCNC: ABNORMAL MG/DL
HCT VFR BLD AUTO: 34.4 % (ref 36–46)
HGB BLD-MCNC: 11.3 G/DL (ref 12–16)
HYALINE CASTS #/AREA URNS AUTO: ABNORMAL /LPF
KETONES UR STRIP.AUTO-MCNC: NEGATIVE MG/DL
LEUKOCYTE ESTERASE UR QL STRIP.AUTO: ABNORMAL
MAGNESIUM SERPL-MCNC: 3.02 MG/DL (ref 1.6–2.4)
MCH RBC QN AUTO: 30.5 PG (ref 26–34)
MCHC RBC AUTO-ENTMCNC: 32.8 G/DL (ref 32–36)
MCV RBC AUTO: 93 FL (ref 80–100)
MICROALBUMIN UR-MCNC: 10.5 MG/L
MICROALBUMIN/CREAT UR: 8.4 UG/MG CREAT
MUCOUS THREADS #/AREA URNS AUTO: ABNORMAL /LPF
NITRITE UR QL STRIP.AUTO: NEGATIVE
NRBC BLD-RTO: 0 /100 WBCS (ref 0–0)
PH UR STRIP.AUTO: 6 [PH]
PHOSPHATE SERPL-MCNC: 5.1 MG/DL (ref 2.5–4.9)
PLATELET # BLD AUTO: 130 X10*3/UL (ref 150–450)
POTASSIUM SERPL-SCNC: 4.5 MMOL/L (ref 3.5–5.3)
PROT UR STRIP.AUTO-MCNC: NEGATIVE MG/DL
RBC # BLD AUTO: 3.71 X10*6/UL (ref 4–5.2)
RBC # UR STRIP.AUTO: NEGATIVE /UL
RBC #/AREA URNS AUTO: ABNORMAL /HPF
SODIUM SERPL-SCNC: 137 MMOL/L (ref 136–145)
SP GR UR STRIP.AUTO: 1.02
SQUAMOUS #/AREA URNS AUTO: ABNORMAL /HPF
URATE SERPL-MCNC: 5.8 MG/DL (ref 2.3–6.7)
UROBILINOGEN UR STRIP.AUTO-MCNC: <2 MG/DL
WBC # BLD AUTO: 4.9 X10*3/UL (ref 4.4–11.3)
WBC #/AREA URNS AUTO: ABNORMAL /HPF

## 2024-01-09 PROCEDURE — 82043 UR ALBUMIN QUANTITATIVE: CPT

## 2024-01-09 PROCEDURE — 36415 COLL VENOUS BLD VENIPUNCTURE: CPT

## 2024-01-09 PROCEDURE — 84550 ASSAY OF BLOOD/URIC ACID: CPT

## 2024-01-09 PROCEDURE — 82570 ASSAY OF URINE CREATININE: CPT

## 2024-01-09 PROCEDURE — 74018 RADEX ABDOMEN 1 VIEW: CPT

## 2024-01-09 PROCEDURE — 81001 URINALYSIS AUTO W/SCOPE: CPT

## 2024-01-09 PROCEDURE — 74018 RADEX ABDOMEN 1 VIEW: CPT | Performed by: RADIOLOGY

## 2024-01-09 PROCEDURE — 85027 COMPLETE CBC AUTOMATED: CPT

## 2024-01-09 PROCEDURE — 80069 RENAL FUNCTION PANEL: CPT

## 2024-01-09 PROCEDURE — 83735 ASSAY OF MAGNESIUM: CPT

## 2024-01-09 RX ORDER — PANTOPRAZOLE SODIUM 40 MG/1
TABLET, DELAYED RELEASE ORAL
Qty: 90 TABLET | Refills: 1 | Status: SHIPPED | OUTPATIENT
Start: 2024-01-09

## 2024-01-23 ENCOUNTER — OFFICE VISIT (OUTPATIENT)
Dept: PRIMARY CARE | Facility: CLINIC | Age: 44
End: 2024-01-23
Payer: MEDICAID

## 2024-01-23 VITALS
WEIGHT: 150 LBS | BODY MASS INDEX: 26.57 KG/M2 | DIASTOLIC BLOOD PRESSURE: 78 MMHG | OXYGEN SATURATION: 99 % | SYSTOLIC BLOOD PRESSURE: 113 MMHG | HEART RATE: 87 BPM

## 2024-01-23 DIAGNOSIS — I10 HYPERTENSION, UNSPECIFIED TYPE: ICD-10-CM

## 2024-01-23 DIAGNOSIS — E55.9 VITAMIN D DEFICIENCY: ICD-10-CM

## 2024-01-23 DIAGNOSIS — N18.30 STAGE 3 CHRONIC KIDNEY DISEASE, UNSPECIFIED WHETHER STAGE 3A OR 3B CKD (MULTI): ICD-10-CM

## 2024-01-23 DIAGNOSIS — Z00.00 WELLNESS EXAMINATION: ICD-10-CM

## 2024-01-23 DIAGNOSIS — I11.0 HYPERTENSIVE HEART DISEASE WITH HEART FAILURE (MULTI): ICD-10-CM

## 2024-01-23 DIAGNOSIS — Z12.11 SCREEN FOR COLON CANCER: ICD-10-CM

## 2024-01-23 DIAGNOSIS — E11.00 TYPE 2 DIABETES MELLITUS WITH HYPEROSMOLARITY WITHOUT COMA, WITHOUT LONG-TERM CURRENT USE OF INSULIN (MULTI): Primary | ICD-10-CM

## 2024-01-23 PROBLEM — E10.10 DIABETIC KETOACIDOSIS WITHOUT COMA ASSOCIATED WITH TYPE 1 DIABETES MELLITUS (MULTI): Status: RESOLVED | Noted: 2023-05-09 | Resolved: 2024-01-23

## 2024-01-23 PROCEDURE — 3066F NEPHROPATHY DOC TX: CPT | Performed by: STUDENT IN AN ORGANIZED HEALTH CARE EDUCATION/TRAINING PROGRAM

## 2024-01-23 PROCEDURE — 3078F DIAST BP <80 MM HG: CPT | Performed by: STUDENT IN AN ORGANIZED HEALTH CARE EDUCATION/TRAINING PROGRAM

## 2024-01-23 PROCEDURE — 3061F NEG MICROALBUMINURIA REV: CPT | Performed by: STUDENT IN AN ORGANIZED HEALTH CARE EDUCATION/TRAINING PROGRAM

## 2024-01-23 PROCEDURE — 99396 PREV VISIT EST AGE 40-64: CPT | Performed by: STUDENT IN AN ORGANIZED HEALTH CARE EDUCATION/TRAINING PROGRAM

## 2024-01-23 PROCEDURE — 99214 OFFICE O/P EST MOD 30 MIN: CPT | Performed by: STUDENT IN AN ORGANIZED HEALTH CARE EDUCATION/TRAINING PROGRAM

## 2024-01-23 PROCEDURE — 3074F SYST BP LT 130 MM HG: CPT | Performed by: STUDENT IN AN ORGANIZED HEALTH CARE EDUCATION/TRAINING PROGRAM

## 2024-01-23 PROCEDURE — 1036F TOBACCO NON-USER: CPT | Performed by: STUDENT IN AN ORGANIZED HEALTH CARE EDUCATION/TRAINING PROGRAM

## 2024-01-23 RX ORDER — ALLOPURINOL 100 MG/1
100 TABLET ORAL DAILY
COMMUNITY
Start: 2023-12-28

## 2024-01-23 RX ORDER — ERGOCALCIFEROL 1.25 MG/1
1 CAPSULE ORAL
COMMUNITY
Start: 2023-10-25

## 2024-01-23 ASSESSMENT — ENCOUNTER SYMPTOMS
DEPRESSION: 0
LOSS OF SENSATION IN FEET: 0
OCCASIONAL FEELINGS OF UNSTEADINESS: 0

## 2024-01-23 NOTE — PROGRESS NOTES
HPI: 43-year-old female presenting for follow-up on chronic conditions, CPE.  Patient doing relatively well without any significant new concerns or interval changes.    DMII  Doing very well, tolerating regimen pretty well, does have to skip Victoza here and there for a day due to constipation.  Blood sugars are good, high of 160.     CKD  Following with nephrology, stable     Anemia  Stable, minimal.     Constipation  Doing better, skipping Victoza some days helps     HTN  Stable, tolerates current regimen well.      SocHx:   - Smoking: Milds daily, probably 2, 25 years or more    Denies HA, changes in vision, chest pain, SOB/VAZQUEZ, palpitations, N/V/D/C, dysuria/hematuria, hematochezia/melena, paresthesias, LE edema.    12 point ROS reviewed and negative other than as stated in HPI      General: Alert, oriented, pleasant, in no acute distress  HEENT:      Head: normocephalic, atraumatic;      eyes: EOMI, no scleral icterus;   Neck: soft, supple, non-tender, no masses appreciated  CV: Heart with regular rate and rhythm, normal S1/S2, no murmurs  Lungs: CTAB without wheezing, rhonchi or rales; good respiratory effort, no increased work of breathing  Abdomen: soft, non-tender, non-distended, no masses appreciated  Extremities: no edema, no cyanosis  Neuro: Cranial nerves grossly intact; alert and oriented, normal gait  Psych: Appropriate mood and affect    #HM  -Nephrologist did CBC and RFP recently, will get remaining annual labs  -Vaccines:       Flu: Declines      Shingrix: at 50      Pneumococcal: at 50      Tdap: Due, defer until next visit  -[facility] w/ navid: declines  -Last PAP: UTD, follows with GYN  -Lung cancer screening with low-dose CT: Milds daily, 25-30 years  -Colonoscopy: Ordered  -Osteoporosis screening: at 65    #HTN  - At goal in office  - Continue amlodipine-olmesartan 10-40 mg daily    #DMII  - Significant improvement, using freestyle federico and staying at goal  - On Victoza and Farxiga  - Ophthalmology  referral  - Diabetic foot exam negative in office  - Repeat A1c     #CKD3b  - GFR stable, following with nephrology     #Anemia  - Stable CBCs     #Constipation  -Not currently having any pain, having relatively routine bowel movements    F/U 4-6 months    Chris D'Amico, DO

## 2024-01-29 DIAGNOSIS — I10 HYPERTENSION: Primary | ICD-10-CM

## 2024-01-29 RX ORDER — ATENOLOL AND CHLORTHALIDONE TABLET 100; 25 MG/1; MG/1
1 TABLET ORAL DAILY
Qty: 30 TABLET | Refills: 0 | Status: SHIPPED | OUTPATIENT
Start: 2024-01-29 | End: 2024-02-28

## 2024-02-26 DIAGNOSIS — D50.9 IRON DEFICIENCY ANEMIA, UNSPECIFIED IRON DEFICIENCY ANEMIA TYPE: ICD-10-CM

## 2024-02-26 DIAGNOSIS — E11.00 TYPE 2 DIABETES MELLITUS WITH HYPEROSMOLARITY WITHOUT COMA, WITHOUT LONG-TERM CURRENT USE OF INSULIN (MULTI): ICD-10-CM

## 2024-02-26 DIAGNOSIS — E78.5 HYPERLIPIDEMIA, UNSPECIFIED HYPERLIPIDEMIA TYPE: ICD-10-CM

## 2024-02-26 DIAGNOSIS — I10 HYPERTENSION: ICD-10-CM

## 2024-02-26 DIAGNOSIS — E11.9 TYPE 2 DIABETES MELLITUS WITHOUT COMPLICATION, WITH LONG-TERM CURRENT USE OF INSULIN (MULTI): ICD-10-CM

## 2024-02-26 DIAGNOSIS — I10 HYPERTENSION, UNSPECIFIED TYPE: ICD-10-CM

## 2024-02-26 DIAGNOSIS — Z79.4 TYPE 2 DIABETES MELLITUS WITHOUT COMPLICATION, WITH LONG-TERM CURRENT USE OF INSULIN (MULTI): ICD-10-CM

## 2024-02-26 RX ORDER — ATENOLOL AND CHLORTHALIDONE TABLET 100; 25 MG/1; MG/1
1 TABLET ORAL DAILY
Qty: 30 TABLET | Refills: 3 | Status: CANCELLED | OUTPATIENT
Start: 2024-02-26 | End: 2024-03-27

## 2024-02-27 RX ORDER — ROSUVASTATIN CALCIUM 20 MG/1
20 TABLET, COATED ORAL DAILY
Qty: 90 TABLET | Refills: 3 | Status: SHIPPED | OUTPATIENT
Start: 2024-02-27 | End: 2025-02-26

## 2024-02-27 RX ORDER — DAPAGLIFLOZIN 10 MG/1
10 TABLET, FILM COATED ORAL DAILY
Qty: 90 TABLET | Refills: 3 | Status: SHIPPED | OUTPATIENT
Start: 2024-02-27 | End: 2025-02-26

## 2024-02-27 RX ORDER — GABAPENTIN 100 MG/1
100 CAPSULE ORAL EVERY 12 HOURS
Qty: 180 CAPSULE | Refills: 3 | Status: SHIPPED | OUTPATIENT
Start: 2024-02-27 | End: 2025-02-26

## 2024-02-27 RX ORDER — CHLORTHALIDONE 25 MG/1
25 TABLET ORAL DAILY
Qty: 90 TABLET | Refills: 3 | Status: SHIPPED | OUTPATIENT
Start: 2024-02-27 | End: 2025-02-26

## 2024-02-27 RX ORDER — FERROUS SULFATE 325(65) MG
1 TABLET ORAL DAILY
Qty: 90 TABLET | Refills: 3 | Status: SHIPPED | OUTPATIENT
Start: 2024-02-27 | End: 2025-02-26

## 2024-03-27 DIAGNOSIS — E61.2 MAGNESIUM DEFICIENCY: ICD-10-CM

## 2024-03-27 RX ORDER — LANOLIN ALCOHOL/MO/W.PET/CERES
1 CREAM (GRAM) TOPICAL 2 TIMES DAILY
Qty: 60 TABLET | Refills: 3 | Status: SHIPPED | OUTPATIENT
Start: 2024-03-27

## 2024-04-05 ENCOUNTER — LAB (OUTPATIENT)
Dept: LAB | Facility: LAB | Age: 44
End: 2024-04-05
Payer: MEDICAID

## 2024-04-05 DIAGNOSIS — E83.42 HYPOMAGNESEMIA: ICD-10-CM

## 2024-04-05 DIAGNOSIS — I10 HYPERTENSION, UNSPECIFIED TYPE: ICD-10-CM

## 2024-04-05 DIAGNOSIS — N18.30 STAGE 3 CHRONIC KIDNEY DISEASE, UNSPECIFIED WHETHER STAGE 3A OR 3B CKD (MULTI): ICD-10-CM

## 2024-04-05 DIAGNOSIS — I11.0 HYPERTENSIVE HEART DISEASE WITH HEART FAILURE (MULTI): ICD-10-CM

## 2024-04-05 DIAGNOSIS — E55.9 VITAMIN D DEFICIENCY: ICD-10-CM

## 2024-04-05 DIAGNOSIS — E11.00 TYPE 2 DIABETES MELLITUS WITH HYPEROSMOLARITY WITHOUT COMA, WITHOUT LONG-TERM CURRENT USE OF INSULIN (MULTI): ICD-10-CM

## 2024-04-05 DIAGNOSIS — E79.0 HYPERURICEMIA WITHOUT SIGNS OF INFLAMMATORY ARTHRITIS AND TOPHACEOUS DISEASE: ICD-10-CM

## 2024-04-05 DIAGNOSIS — Z00.00 WELLNESS EXAMINATION: ICD-10-CM

## 2024-04-05 DIAGNOSIS — N18.30 CHRONIC KIDNEY DISEASE, STAGE 3 UNSPECIFIED (MULTI): Primary | ICD-10-CM

## 2024-04-05 PROCEDURE — 81001 URINALYSIS AUTO W/SCOPE: CPT

## 2024-04-05 PROCEDURE — 36415 COLL VENOUS BLD VENIPUNCTURE: CPT

## 2024-04-05 PROCEDURE — 83036 HEMOGLOBIN GLYCOSYLATED A1C: CPT

## 2024-04-05 PROCEDURE — 82306 VITAMIN D 25 HYDROXY: CPT

## 2024-04-05 PROCEDURE — 80069 RENAL FUNCTION PANEL: CPT

## 2024-04-05 PROCEDURE — 80061 LIPID PANEL: CPT

## 2024-04-05 PROCEDURE — 84443 ASSAY THYROID STIM HORMONE: CPT

## 2024-04-05 PROCEDURE — 84550 ASSAY OF BLOOD/URIC ACID: CPT

## 2024-04-05 PROCEDURE — 83735 ASSAY OF MAGNESIUM: CPT

## 2024-04-06 LAB
25(OH)D3 SERPL-MCNC: 40 NG/ML (ref 30–100)
ALBUMIN SERPL BCP-MCNC: 4.1 G/DL (ref 3.4–5)
ANION GAP SERPL CALC-SCNC: 18 MMOL/L (ref 10–20)
APPEARANCE UR: CLEAR
BILIRUB UR STRIP.AUTO-MCNC: NEGATIVE MG/DL
BUN SERPL-MCNC: 29 MG/DL (ref 6–23)
CALCIUM SERPL-MCNC: 9.5 MG/DL (ref 8.6–10.6)
CHLORIDE SERPL-SCNC: 97 MMOL/L (ref 98–107)
CHOLEST SERPL-MCNC: 211 MG/DL (ref 0–199)
CHOLESTEROL/HDL RATIO: 2.5
CO2 SERPL-SCNC: 27 MMOL/L (ref 21–32)
COLOR UR: ABNORMAL
CREAT SERPL-MCNC: 1.29 MG/DL (ref 0.5–1.05)
EGFRCR SERPLBLD CKD-EPI 2021: 52 ML/MIN/1.73M*2
EST. AVERAGE GLUCOSE BLD GHB EST-MCNC: 163 MG/DL
GLUCOSE SERPL-MCNC: 160 MG/DL (ref 74–99)
GLUCOSE UR STRIP.AUTO-MCNC: ABNORMAL MG/DL
HBA1C MFR BLD: 7.3 %
HDLC SERPL-MCNC: 85.7 MG/DL
KETONES UR STRIP.AUTO-MCNC: ABNORMAL MG/DL
LDLC SERPL CALC-MCNC: 92 MG/DL
LEUKOCYTE ESTERASE UR QL STRIP.AUTO: NEGATIVE
MAGNESIUM SERPL-MCNC: 2.15 MG/DL (ref 1.6–2.4)
NITRITE UR QL STRIP.AUTO: NEGATIVE
NON HDL CHOLESTEROL: 125 MG/DL (ref 0–149)
PH UR STRIP.AUTO: 6 [PH]
PHOSPHATE SERPL-MCNC: 4.3 MG/DL (ref 2.5–4.9)
POTASSIUM SERPL-SCNC: 3.7 MMOL/L (ref 3.5–5.3)
PROT UR STRIP.AUTO-MCNC: ABNORMAL MG/DL
RBC # UR STRIP.AUTO: NEGATIVE /UL
RBC #/AREA URNS AUTO: NORMAL /HPF
SODIUM SERPL-SCNC: 138 MMOL/L (ref 136–145)
SP GR UR STRIP.AUTO: 1.02
TRIGL SERPL-MCNC: 167 MG/DL (ref 0–149)
TSH SERPL-ACNC: 0.92 MIU/L (ref 0.44–3.98)
URATE SERPL-MCNC: 3.8 MG/DL (ref 2.3–6.7)
UROBILINOGEN UR STRIP.AUTO-MCNC: NORMAL MG/DL
VLDL: 33 MG/DL (ref 0–40)
WBC #/AREA URNS AUTO: NORMAL /HPF

## 2024-04-08 NOTE — RESULT ENCOUNTER NOTE
LDL at 92, which is above diabetic goal of 70, but significant improvement from last lab.  Continue current regimen.    Triglycerides borderline elevated at 167, again good improvement from last lab.    Hemoglobin A1c at 7.3, which is above goal, but significant improvement over the last year.  Continue to work on diet, particularly reduction in carbohydrates.    Remaining labs unremarkable.

## 2024-04-09 ENCOUNTER — TELEPHONE (OUTPATIENT)
Dept: PRIMARY CARE | Facility: CLINIC | Age: 44
End: 2024-04-09
Payer: MEDICAID

## 2024-04-09 NOTE — TELEPHONE ENCOUNTER
----- Message from Christopher D'Amico, DO sent at 4/8/2024  1:31 PM EDT -----  LDL at 92, which is above diabetic goal of 70, but significant improvement from last lab.  Continue current regimen.    Triglycerides borderline elevated at 167, again good improvement from last lab.    Hemoglobin A1c at 7.3, which is above goal, but significant improvement over the last year.  Continue to work on diet, particularly reduction in carbohydrates.    Remaining labs unremarkable.

## 2024-04-09 NOTE — TELEPHONE ENCOUNTER
Result Communication    Resulted Orders   Hemoglobin A1C   Result Value Ref Range    Hemoglobin A1C 7.3 (H) see below %    Estimated Average Glucose 163 Not Established mg/dL    Narrative    Diagnosis of Diabetes-Adults  Non-Diabetic: < or = 5.6%  Increased risk for developing diabetes: 5.7-6.4%  Diagnostic of diabetes: > or = 6.5%    Monitoring of Diabetes  Age (y)....................... Therapeutic Goal (%)  Adults: >18.........................<7.0  Pediatrics: 13-18...................<7.5  Pediatrics: 7-12....................<8.0  Pediatrics: 0-6..................... 7.5-8.5    American Diabetes Association. Diabetes Care 33(S1), Jan 2010       Lipid Panel   Result Value Ref Range    Cholesterol 211 (H) 0 - 199 mg/dL      Comment:            Age      Desirable   Borderline High   High     0-19 Y     0 - 169       170 - 199     >/= 200    20-24 Y     0 - 189       190 - 224     >/= 225         >24 Y     0 - 199       200 - 239     >/= 240   **All ranges are based on fasting samples. Specific   therapeutic targets will vary based on patient-specific   cardiac risk.    Pediatric guidelines reference:Pediatrics 2011, 128(S5).Adult guidelines reference: NCEP ATPIII Guidelines,DANA 2001, 258:2486-97    Venipuncture immediately after or during the administration of Metamizole may lead to falsely low results. Testing should be performed immediately prior to Metamizole dosing.    HDL-Cholesterol 85.7 mg/dL      Comment:        Age       Very Low   Low     Normal    High    0-19 Y    < 35      < 40     40-45     ----  20-24 Y    ----     < 40      >45      ----        >24 Y      ----     < 40     40-60      >60      Cholesterol/HDL Ratio 2.5       Comment:        Ref Values  Desirable  < 3.4  High Risk  > 5.0    LDL Calculated 92 <=99 mg/dL      Comment:                                  Near   Borderline      AGE      Desirable  Optimal    High     High     Very High     0-19 Y     0 - 109     ---    110-129   >/= 130      ----    20-24 Y     0 - 119     ---    120-159   >/= 160     ----      >24 Y     0 -  99   100-129  130-159   160-189     >/=190      VLDL 33 0 - 40 mg/dL    Triglycerides 167 (H) 0 - 149 mg/dL      Comment:         Age         Desirable   Borderline High   High     Very High   0 D-90 D    19 - 174         ----         ----        ----  91 D- 9 Y     0 -  74        75 -  99     >/= 100      ----    10-19 Y     0 -  89        90 - 129     >/= 130      ----    20-24 Y     0 - 114       115 - 149     >/= 150      ----         >24 Y     0 - 149       150 - 199    200- 499    >/= 500    Venipuncture immediately after or during the administration of Metamizole may lead to falsely low results. Testing should be performed immediately prior to Metamizole dosing.    Non HDL Cholesterol 125 0 - 149 mg/dL      Comment:            Age       Desirable   Borderline High   High     Very High     0-19 Y     0 - 119       120 - 144     >/= 145    >/= 160    20-24 Y     0 - 149       150 - 189     >/= 190      ----         >24 Y    30 mg/dL above LDL Cholesterol goal     Vitamin D 25-Hydroxy,Total (for eval of Vitamin D levels)   Result Value Ref Range    Vitamin D, 25-Hydroxy, Total 40 30 - 100 ng/mL    Narrative    Deficiency:         < 20   ng/ml  Insufficiency:      20-29  ng/ml  Sufficiency:         ng/ml  This assay accurately quantifies the sum of Vitamin D3, 25-Hydroxy and Vitamin D2,25-Hydroxy.   TSH with reflex to Free T4 if abnormal   Result Value Ref Range    Thyroid Stimulating Hormone 0.92 0.44 - 3.98 mIU/L    Narrative    TSH testing is performed using different testing methodology at [facility] than at other [facility]. Direct result comparisons should only be made within the same method.         10:10 AM      Results were successfully communicated with the patient and they acknowledged their understanding.

## 2024-06-25 ENCOUNTER — APPOINTMENT (OUTPATIENT)
Dept: PRIMARY CARE | Facility: CLINIC | Age: 44
End: 2024-06-25
Payer: MEDICAID

## 2024-06-25 VITALS
HEART RATE: 106 BPM | HEIGHT: 63 IN | OXYGEN SATURATION: 98 % | WEIGHT: 152.8 LBS | SYSTOLIC BLOOD PRESSURE: 141 MMHG | BODY MASS INDEX: 27.07 KG/M2 | DIASTOLIC BLOOD PRESSURE: 92 MMHG

## 2024-06-25 DIAGNOSIS — E11.00 TYPE 2 DIABETES MELLITUS WITH HYPEROSMOLARITY WITHOUT COMA, WITHOUT LONG-TERM CURRENT USE OF INSULIN (MULTI): Primary | ICD-10-CM

## 2024-06-25 DIAGNOSIS — F41.9 ANXIETY AND DEPRESSION: ICD-10-CM

## 2024-06-25 DIAGNOSIS — F17.200 NICOTINE USE DISORDER: ICD-10-CM

## 2024-06-25 DIAGNOSIS — I10 HYPERTENSION, UNSPECIFIED TYPE: ICD-10-CM

## 2024-06-25 DIAGNOSIS — K21.9 GASTROESOPHAGEAL REFLUX DISEASE, UNSPECIFIED WHETHER ESOPHAGITIS PRESENT: ICD-10-CM

## 2024-06-25 DIAGNOSIS — D64.9 ANEMIA, UNSPECIFIED TYPE: ICD-10-CM

## 2024-06-25 DIAGNOSIS — F32.A ANXIETY AND DEPRESSION: ICD-10-CM

## 2024-06-25 DIAGNOSIS — L65.9 HAIR LOSS: ICD-10-CM

## 2024-06-25 DIAGNOSIS — N18.9 CHRONIC KIDNEY DISEASE, UNSPECIFIED CKD STAGE: ICD-10-CM

## 2024-06-25 PROCEDURE — 3080F DIAST BP >= 90 MM HG: CPT | Performed by: STUDENT IN AN ORGANIZED HEALTH CARE EDUCATION/TRAINING PROGRAM

## 2024-06-25 PROCEDURE — 3077F SYST BP >= 140 MM HG: CPT | Performed by: STUDENT IN AN ORGANIZED HEALTH CARE EDUCATION/TRAINING PROGRAM

## 2024-06-25 PROCEDURE — 1036F TOBACCO NON-USER: CPT | Performed by: STUDENT IN AN ORGANIZED HEALTH CARE EDUCATION/TRAINING PROGRAM

## 2024-06-25 PROCEDURE — 99214 OFFICE O/P EST MOD 30 MIN: CPT | Performed by: STUDENT IN AN ORGANIZED HEALTH CARE EDUCATION/TRAINING PROGRAM

## 2024-06-25 PROCEDURE — 3051F HG A1C>EQUAL 7.0%<8.0%: CPT | Performed by: STUDENT IN AN ORGANIZED HEALTH CARE EDUCATION/TRAINING PROGRAM

## 2024-06-25 PROCEDURE — 3048F LDL-C <100 MG/DL: CPT | Performed by: STUDENT IN AN ORGANIZED HEALTH CARE EDUCATION/TRAINING PROGRAM

## 2024-06-25 PROCEDURE — 3061F NEG MICROALBUMINURIA REV: CPT | Performed by: STUDENT IN AN ORGANIZED HEALTH CARE EDUCATION/TRAINING PROGRAM

## 2024-06-25 RX ORDER — PANTOPRAZOLE SODIUM 40 MG/1
TABLET, DELAYED RELEASE ORAL
Qty: 90 TABLET | Refills: 1 | Status: SHIPPED | OUTPATIENT
Start: 2024-06-25

## 2024-06-25 ASSESSMENT — COLUMBIA-SUICIDE SEVERITY RATING SCALE - C-SSRS
6. HAVE YOU EVER DONE ANYTHING, STARTED TO DO ANYTHING, OR PREPARED TO DO ANYTHING TO END YOUR LIFE?: NO
2. HAVE YOU ACTUALLY HAD ANY THOUGHTS OF KILLING YOURSELF?: NO
1. IN THE PAST MONTH, HAVE YOU WISHED YOU WERE DEAD OR WISHED YOU COULD GO TO SLEEP AND NOT WAKE UP?: NO

## 2024-06-25 ASSESSMENT — PATIENT HEALTH QUESTIONNAIRE - PHQ9
2. FEELING DOWN, DEPRESSED OR HOPELESS: NOT AT ALL
SUM OF ALL RESPONSES TO PHQ9 QUESTIONS 1 AND 2: 0
1. LITTLE INTEREST OR PLEASURE IN DOING THINGS: NOT AT ALL

## 2024-06-25 ASSESSMENT — PAIN SCALES - GENERAL: PAINLEVEL: 0-NO PAIN

## 2024-06-25 NOTE — PROGRESS NOTES
43-year-old female presenting for follow-up on multiple concerns.    DMII  Stable, tolerating regimen well.     CKD  Following with nephrology, stable     Anemia  Stable, minimal.     Constipation  Doing better, skipping Victoza some days helps     HTN  Stable, tolerates current regimen well.     Anxiety, depression, external stressors  Is dealing with many stressors in her life currently, restless, anxious.  Willing to talk to counselor.     SocHx:   - Smoking: Milds daily, probably 2, 25 years or more    Denies HA, changes in vision, chest pain, SOB/VAZQUEZ, palpitations, N/V/D/C, dysuria/hematuria, hematochezia/melena, paresthesias, LE edema.    12 point ROS reviewed and negative other than as stated in HPI      General: Alert, oriented, pleasant, in no acute distress  HEENT:      Head: normocephalic, atraumatic;      eyes: EOMI, no scleral icterus;   Neck: soft, supple, non-tender, no masses appreciated  CV: Heart with regular rate and rhythm, normal S1/S2, no murmurs  Lungs: CTAB without wheezing, rhonchi or rales; good respiratory effort, no increased work of breathing  Neuro: Cranial nerves grossly intact; alert and oriented, normal gait  Psych: Tearful throughout exam    #HTN  - Above goal in office, but tearful throughout exam  - Continue amlodipine-olmesartan 10-40 mg daily    #DMII  - On Victoza and Farxiga  - Ophthalmology referral  - Diabetic foot exam negative in office last appointment  - Repeat A1c     #CKD3a  - GFR stable, even improving, following with nephrology  -Repeat CMP    #Anemia  - Stable CBC     #Constipation  -Not currently having any pain, having relatively routine bowel movements    #Stress #Anxiety #Depression  -Referral to collaborative behavioral health    F/U 3-4 months, CPE in January    Chris D'Amico, DO

## 2024-07-02 ENCOUNTER — APPOINTMENT (OUTPATIENT)
Dept: PRIMARY CARE | Facility: CLINIC | Age: 44
End: 2024-07-02
Payer: MEDICAID

## 2024-07-02 ASSESSMENT — PATIENT HEALTH QUESTIONNAIRE - PHQ9
7. TROUBLE CONCENTRATING ON THINGS, SUCH AS READING THE NEWSPAPER OR WATCHING TELEVISION: SEVERAL DAYS
9. THOUGHTS THAT YOU WOULD BE BETTER OFF DEAD, OR OF HURTING YOURSELF: NOT AT ALL
4. FEELING TIRED OR HAVING LITTLE ENERGY: NEARLY EVERY DAY
6. FEELING BAD ABOUT YOURSELF - OR THAT YOU ARE A FAILURE OR HAVE LET YOURSELF OR YOUR FAMILY DOWN: SEVERAL DAYS
3. TROUBLE FALLING OR STAYING ASLEEP: NEARLY EVERY DAY
SUM OF ALL RESPONSES TO PHQ QUESTIONS 1-9: 13
2. FEELING DOWN, DEPRESSED OR HOPELESS: SEVERAL DAYS
5. POOR APPETITE OR OVEREATING: NEARLY EVERY DAY
10. IF YOU CHECKED OFF ANY PROBLEMS, HOW DIFFICULT HAVE THESE PROBLEMS MADE IT FOR YOU TO DO YOUR WORK, TAKE CARE OF THINGS AT HOME, OR GET ALONG WITH OTHER PEOPLE: SOMEWHAT DIFFICULT
1. LITTLE INTEREST OR PLEASURE IN DOING THINGS: NOT AT ALL
SUM OF ALL RESPONSES TO PHQ9 QUESTIONS 1 & 2: 1
8. MOVING OR SPEAKING SO SLOWLY THAT OTHER PEOPLE COULD HAVE NOTICED. OR THE OPPOSITE, BEING SO FIGETY OR RESTLESS THAT YOU HAVE BEEN MOVING AROUND A LOT MORE THAN USUAL: SEVERAL DAYS

## 2024-07-02 ASSESSMENT — ANXIETY QUESTIONNAIRES
IF YOU CHECKED OFF ANY PROBLEMS ON THIS QUESTIONNAIRE, HOW DIFFICULT HAVE THESE PROBLEMS MADE IT FOR YOU TO DO YOUR WORK, TAKE CARE OF THINGS AT HOME, OR GET ALONG WITH OTHER PEOPLE: SOMEWHAT DIFFICULT
1. FEELING NERVOUS, ANXIOUS, OR ON EDGE: MORE THAN HALF THE DAYS
2. NOT BEING ABLE TO STOP OR CONTROL WORRYING: NEARLY EVERY DAY
GAD7 TOTAL SCORE: 17
6. BECOMING EASILY ANNOYED OR IRRITABLE: NEARLY EVERY DAY
7. FEELING AFRAID AS IF SOMETHING AWFUL MIGHT HAPPEN: NEARLY EVERY DAY
3. WORRYING TOO MUCH ABOUT DIFFERENT THINGS: NEARLY EVERY DAY
5. BEING SO RESTLESS THAT IT IS HARD TO SIT STILL: NOT AT ALL
4. TROUBLE RELAXING: NEARLY EVERY DAY
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
## 2024-07-09 ENCOUNTER — APPOINTMENT (OUTPATIENT)
Dept: PRIMARY CARE | Facility: CLINIC | Age: 44
End: 2024-07-09
Payer: MEDICAID

## 2024-07-11 ENCOUNTER — DOCUMENTATION (OUTPATIENT)
Dept: BEHAVIORAL HEALTH | Facility: CLINIC | Age: 44
End: 2024-07-11
Payer: MEDICAID

## 2024-07-11 NOTE — PROGRESS NOTES
[facility] Psychiatry Consult Note     Jennifer Overton is a 43 y.o., referred to Collaborative Care for symptoms of depression and anxiety. I have reviewed the patient with the behavioral health manager and reviewed the patient's electronic record.    Recommendations:   [facility] will conduct therapy for anxiety. Please follow medication algorithm here.  1. Start by titrating a SSRI to effect: Lexapro  2. If no improvement, taper off and try titrating an SNRI to effect: Duloxetine  3. If partial improvement in either case, consider augmentation with pregabalin. If no improvement in either case, replace with pregabalin.  4. If no improvement with pregabalin, taper and replace with buspirone.  5. If no improvement with buspirone, taper and replace with an antipsychotic with anxiolytic properties, such as aripiprazole (low metabolic adverse effects) or quetiapine (more sedative effects can help with insomnia), usually for short-term use.    Escitalopram (Lexapro)  ===================  DOSING INFORMATION:   Week 1: Baseline weight. Consider BMP for baseline sodium in older adults. Start: 5 mg qday.   Week 2: Increase dose to an Initial Target Dosage of 10 mg qday, if tolerated. Typical Dosage Range: 10-20 mg qday. Max: 20 mg qday.  Discontinuation: 25% per week to 25% per month depending on length of treatment in order to minimize withdrawal symptoms and relapse.  MONITORING: Weight. Consider posttreatment BMP to rule out hyponatremia in older adults.  GENERAL INFORMATION: Mechanism of Action: Highly selective serotonin reuptake inhibitor; S-enantiomer of the racemic derivative of citalopram.   FDA Indications: MDD (acute and maintenance), TRICIA.   Off-Label Indications: Other anxiety disorders.  Pharmacokinetics: T½ = 27-32 hrs.   Common Side effects (MDD): nausea (15%), ejaculation disorder (9%), insomnia (9%), somnolence (6%), fatigue (5%), sweating increased (5%).   Black Box Warning: Increased SI in patients < 26 y/o.    Contraindications: Use of a MAOI within 14 days of stopping escitalopram, concurrent use of a MAOI including drugs with significant MAOI activity (e.g., linezolid), or use of escitalopram within 14 days of stopping a MAOI. Concomitant use with pimozide.   Warnings and Precautions: Clinical worsening and suicide risk, serotonin syndrome, discontinuation symptoms, seizures, hypomanic/manic switch, hyponatremia, abnormal bleeding.   Metabolism/Pharmacogenomics: Primarily metabolized by 2C19 & 3A4.   Significant drug-drug interactions: Weak 2D6 inhibitor; Use caution when coadministered with drugs metabolized by 2D6. Check all drug-drug interactions.    Duloxetine (Cymbalta)  ==================  DOSING INFORMATION  Week 1: Baseline weight and blood pressure. BMP for baseline sodium in older adults. Start: 30 mg qday.  Week 2: Increase dose to the Initial and Typical Target Dose of 60 mg qday or 30 mg bid, if tolerated.   Max Dose: 120 mg qday (little evidence that higher doses are beneficial).   Discontinuation: 25% per week to 25% per month depending on length of treatment in order to minimize withdrawal symptoms and relapse.  MONITORING: Blood pressure, weight. Posttreatment BMP to rule out hyponatremia in older adults.  GENERAL INFORMATION:   Mechanism of Action: Serotonin/Norepinephrine Reuptake Inhibitor (SNRI).   FDA Indications: MDD, TRICIA, diabetic peripheral neuropathic pain, fibromyalgia; chronic musculoskeletal pain.   Off-Label Indications: Second-line ADHD, other pain, other anxiety.  Pharmacokinetics: T½ = 12 hrs.   Common Side effects (MDD & TRICIA): nausea (25%), dry mouth (15%), diarrhea (10%), constipation (10%), fatigue (10%), dizziness (10%), somnolence (10%), insomnia (10%), decreased appetite (7%), hyperhidrosis (6%), vomiting (5%), agitation (5%).  Black Box Warning: Increased SI in patients < 24 y/o.   Contraindications:Use of a MAOI within 14 days of stopping Cymbalta, concurrent use of a MAOI  including drugs with significant MAOI activity (e.g., linezolid), use of Cymbalta within 14 days of stopping a MAOI, use in patients with uncontrolled narrow angle glaucoma. Warnings and Precautions: Suicidality, hepatotoxicity (should not be prescribed in patients with substantial alcohol use or evidence of chronic liver disease), orthostatic hypotension and syncope, serotonin syndrome, abnormal bleeding, severe skin reactions, discontinuation symptoms, manic switch, seizures, increased BP, use with 1A2 inhibitors or thioridazine, hyponatremia, hepatic insufficiency and severe renal impairment, use caution in patient with controlled narrow-angle glaucoma and with slow gastric emptying, elevation in fasting blood glucose and HbA1C, urinary hesitance and retention. Metabolism/ Pharmacogenomics: Metabolized by 1A2 and 2D6.   Significant drug-drug interactions: 2D6 inhibitor. Avoid co-administration with potent 1A2 inhibitors (e.g., fluvoxamine); and use cautiously with 2D6 inhibitors (e.g., fluoxetine and paroxetine). Potential for abnormal bleeding with NSAIDs or anticoagulants; Check all drug-drug interactions before prescribing.   Dosage Form: Capsule (Do not cut, crush or chew).      Diagnosis:   Illness Anxiety Disorder vs Generalized Anxiety Disorder vs Adjustment Disorder  High anxiety related to medical conditions: HTN and T2D with recent gluteal abscess    Patient Health Questionnaire-9 Score: 13 (7/2/2024 11:00 AM)  TRICIA-7 Total Score: 17 (7/2/2024 11:00 AM)      The above treatment considerations and suggestions are based on consultations with the patient's care manager and a review of information available in the electronic medical record. I have not personally examined the patient. All recommendations should be implemented with consideration of the patient's relevant prior history and current clinical status. Please feel free to call me with any questions about the care of this patient. I can easily be  reached through [employer] Chat.

## 2024-07-16 DIAGNOSIS — F41.9 ANXIETY: Primary | ICD-10-CM

## 2024-07-16 RX ORDER — ESCITALOPRAM OXALATE 10 MG/1
10 TABLET ORAL DAILY
Qty: 30 TABLET | Refills: 5 | Status: SHIPPED | OUTPATIENT
Start: 2024-07-16 | End: 2025-01-12

## 2024-07-16 NOTE — PROGRESS NOTES
Per psychiatry recommendations, we are starting patient on Lexapro 10 mg daily, will continue with follow-up in 6-8 weeks

## 2024-07-22 ENCOUNTER — APPOINTMENT (OUTPATIENT)
Dept: PRIMARY CARE | Facility: CLINIC | Age: 44
End: 2024-07-22
Payer: MEDICAID

## 2024-07-22 NOTE — PROGRESS NOTES
"Collaborative Care (CoCM)  Progress Note    Type of Interaction: In Office    Start Time: 12p    End Time: 1p        Appointment: Scheduled    Reason for Visit: No chief complaint on file.         Interval History / Patient Symptoms: Patient reports the day she left here she slept 9 hours in a row and that has not happened for her in a long time. Patient reports that when she lays down all the things pop into her mind. Patient will start to journal so that she can get things out of her mind. Patient and BHM discussed sleep hygiene and her routine to go to bed. Patient and BHM discussed ways to reduce her stress and anxiety.  Patient reports she enjoys reading and it helps her to relax. Patient would like to get back to reading which she really loves. Patient and BHM processed anxiety when things are outside of our control is it helpful or healthy.  Patient and BHM discussed setting good boundaries. Patient and BHM discussed effective communication with others.  Patient states she is an \"enabler\" to others. Patient is going to starts to set limits with others. Patient to complete values clarification.    No data recorded      Interventions Provided: Behavioral Activation, Communication Training, Develop Coping Strategies, Review Progress/Goals Stress Management, and Mindfulness      Progress Made: Moderate    Response to Intervention: Patient is able to process and discuss how her thoughts impact her feelings and her actions. Patient is aware that setting boundaries can be met with resistance from others.               Follow Up / Next Appointment:    8/13/2024@12p    "

## 2024-07-29 ENCOUNTER — DOCUMENTATION (OUTPATIENT)
Dept: PRIMARY CARE | Facility: CLINIC | Age: 44
End: 2024-07-29
Payer: MEDICAID

## 2024-07-29 DIAGNOSIS — F41.1 GAD (GENERALIZED ANXIETY DISORDER): Primary | ICD-10-CM

## 2024-07-29 PROCEDURE — 99494 1ST/SBSQ PSYC COLLAB CARE: CPT | Performed by: STUDENT IN AN ORGANIZED HEALTH CARE EDUCATION/TRAINING PROGRAM

## 2024-07-29 PROCEDURE — 99492 1ST PSYC COLLAB CARE MGMT: CPT | Performed by: STUDENT IN AN ORGANIZED HEALTH CARE EDUCATION/TRAINING PROGRAM

## 2024-08-01 DIAGNOSIS — E11.00 TYPE 2 DIABETES MELLITUS WITH HYPEROSMOLARITY WITHOUT COMA, WITHOUT LONG-TERM CURRENT USE OF INSULIN (MULTI): Primary | ICD-10-CM

## 2024-08-13 ENCOUNTER — APPOINTMENT (OUTPATIENT)
Dept: PRIMARY CARE | Facility: CLINIC | Age: 44
End: 2024-08-13
Payer: MEDICAID

## 2024-08-13 ENCOUNTER — LAB (OUTPATIENT)
Dept: LAB | Facility: LAB | Age: 44
End: 2024-08-13
Payer: MEDICAID

## 2024-08-13 DIAGNOSIS — E83.42 HYPOMAGNESEMIA: ICD-10-CM

## 2024-08-13 DIAGNOSIS — E79.0 HYPERURICEMIA WITHOUT SIGNS OF INFLAMMATORY ARTHRITIS AND TOPHACEOUS DISEASE: ICD-10-CM

## 2024-08-13 DIAGNOSIS — N18.30 CHRONIC KIDNEY DISEASE, STAGE 3 UNSPECIFIED (MULTI): Primary | ICD-10-CM

## 2024-08-13 DIAGNOSIS — D64.9 ANEMIA, UNSPECIFIED: ICD-10-CM

## 2024-08-13 LAB
25(OH)D3 SERPL-MCNC: 22 NG/ML (ref 30–100)
ALBUMIN SERPL BCP-MCNC: 4.7 G/DL (ref 3.4–5)
ANION GAP SERPL CALC-SCNC: 17 MMOL/L (ref 10–20)
BUN SERPL-MCNC: 31 MG/DL (ref 6–23)
CALCIUM SERPL-MCNC: 9.7 MG/DL (ref 8.6–10.6)
CHLORIDE SERPL-SCNC: 97 MMOL/L (ref 98–107)
CO2 SERPL-SCNC: 25 MMOL/L (ref 21–32)
CREAT SERPL-MCNC: 1.36 MG/DL (ref 0.5–1.05)
EGFRCR SERPLBLD CKD-EPI 2021: 49 ML/MIN/1.73M*2
ERYTHROCYTE [DISTWIDTH] IN BLOOD BY AUTOMATED COUNT: 17.6 % (ref 11.5–14.5)
GLUCOSE SERPL-MCNC: 184 MG/DL (ref 74–99)
HCT VFR BLD AUTO: 38.2 % (ref 36–46)
HGB BLD-MCNC: 11.9 G/DL (ref 12–16)
MAGNESIUM SERPL-MCNC: 2.16 MG/DL (ref 1.6–2.4)
MCH RBC QN AUTO: 27.8 PG (ref 26–34)
MCHC RBC AUTO-ENTMCNC: 31.2 G/DL (ref 32–36)
MCV RBC AUTO: 89 FL (ref 80–100)
NRBC BLD-RTO: 0 /100 WBCS (ref 0–0)
PHOSPHATE SERPL-MCNC: 3.5 MG/DL (ref 2.5–4.9)
PLATELET # BLD AUTO: 157 X10*3/UL (ref 150–450)
POTASSIUM SERPL-SCNC: 3.6 MMOL/L (ref 3.5–5.3)
RBC # BLD AUTO: 4.28 X10*6/UL (ref 4–5.2)
SODIUM SERPL-SCNC: 135 MMOL/L (ref 136–145)
URATE SERPL-MCNC: 8.2 MG/DL (ref 2.3–6.7)
WBC # BLD AUTO: 4.6 X10*3/UL (ref 4.4–11.3)

## 2024-08-13 PROCEDURE — 84550 ASSAY OF BLOOD/URIC ACID: CPT

## 2024-08-13 PROCEDURE — 83970 ASSAY OF PARATHORMONE: CPT

## 2024-08-13 PROCEDURE — 82043 UR ALBUMIN QUANTITATIVE: CPT

## 2024-08-13 PROCEDURE — 83735 ASSAY OF MAGNESIUM: CPT

## 2024-08-13 PROCEDURE — 36415 COLL VENOUS BLD VENIPUNCTURE: CPT

## 2024-08-13 PROCEDURE — 82306 VITAMIN D 25 HYDROXY: CPT

## 2024-08-13 PROCEDURE — 82570 ASSAY OF URINE CREATININE: CPT

## 2024-08-13 PROCEDURE — 81001 URINALYSIS AUTO W/SCOPE: CPT

## 2024-08-13 PROCEDURE — 85027 COMPLETE CBC AUTOMATED: CPT

## 2024-08-13 PROCEDURE — 80069 RENAL FUNCTION PANEL: CPT

## 2024-08-13 NOTE — PROGRESS NOTES
[facility] ([facility])  Progress Note    Type of Interaction: In Office    Start Time: 12p    End Time: 1p        Appointment: Scheduled    Reason for Visit:   Chief Complaint   Patient presents with    TRICIA          Interval History / Patient Symptoms: Patient and BHM checked in with patient on how she has been doing. Patient discussed her meds with her PCP. Patient is on 10mg Lexapro. Patient and BHM discussed the effects. Patient recently stopped smoking about 1 week ago. Patient is proud of herself and the change.  Patient thinks about what her triggers are and has not felt the desire to smoke. Patient has removed all triggers from her life.  Patient states she is feeling happiness and pablo. Patient states her heart is no longer palpitating and she notices since she stopped smoking she feels better physically.  Patient feels like she has more energy and is really happy with her choice to stop. Patient had been praying to get the help and support to stop smoking. Patient and BHM discussed why it is difficult for her to take credit for getting things accomplished.  Patient recognizes that beating herself up tears her down. Patient is feeling like she does for others and it is frustrating to her that others are not there for her when she needs it.  Patient is very spiritual. Patient and [facility] discussed living in the moment and dealing with things as they happen or occur.  Patient and BHM discussed self care and self compassion and self worth. Patient is working on setting healthy boundaries with others and learning not to settle. Patient is tearful and recognizes that tears are cleansing.    No data recorded      Interventions Provided: [place] Setting, Behavioral Activation, Develop Coping Strategies, Review Progress/Goals Stress Management, and Mindfulness      Progress Made: Moderate    Response to Intervention:   Patient is open and engaging. Patient is able to process her feelings and connections to how she  thinks and how she behaves.                 Follow Up / Next Appointment:    8/26/2024@

## 2024-08-14 LAB
APPEARANCE UR: ABNORMAL
BILIRUB UR STRIP.AUTO-MCNC: NEGATIVE MG/DL
COLOR UR: ABNORMAL
CREAT UR-MCNC: 172.7 MG/DL (ref 20–320)
GLUCOSE UR STRIP.AUTO-MCNC: ABNORMAL MG/DL
KETONES UR STRIP.AUTO-MCNC: NEGATIVE MG/DL
LEUKOCYTE ESTERASE UR QL STRIP.AUTO: ABNORMAL
MICROALBUMIN UR-MCNC: 58.7 MG/L
MICROALBUMIN/CREAT UR: 34 UG/MG CREAT
NITRITE UR QL STRIP.AUTO: NEGATIVE
PH UR STRIP.AUTO: 5.5 [PH]
PROT UR STRIP.AUTO-MCNC: ABNORMAL MG/DL
PTH-INTACT SERPL-MCNC: 71.5 PG/ML (ref 18.5–88)
RBC # UR STRIP.AUTO: NEGATIVE /UL
RBC #/AREA URNS AUTO: NORMAL /HPF
SP GR UR STRIP.AUTO: 1.02
UROBILINOGEN UR STRIP.AUTO-MCNC: NORMAL MG/DL
WBC #/AREA URNS AUTO: NORMAL /HPF

## 2024-08-14 ASSESSMENT — ANXIETY QUESTIONNAIRES
4. TROUBLE RELAXING: SEVERAL DAYS
3. WORRYING TOO MUCH ABOUT DIFFERENT THINGS: SEVERAL DAYS
7. FEELING AFRAID AS IF SOMETHING AWFUL MIGHT HAPPEN: SEVERAL DAYS
IF YOU CHECKED OFF ANY PROBLEMS ON THIS QUESTIONNAIRE, HOW DIFFICULT HAVE THESE PROBLEMS MADE IT FOR YOU TO DO YOUR WORK, TAKE CARE OF THINGS AT HOME, OR GET ALONG WITH OTHER PEOPLE: SOMEWHAT DIFFICULT
GAD7 TOTAL SCORE: 6
6. BECOMING EASILY ANNOYED OR IRRITABLE: SEVERAL DAYS
1. FEELING NERVOUS, ANXIOUS, OR ON EDGE: SEVERAL DAYS
2. NOT BEING ABLE TO STOP OR CONTROL WORRYING: SEVERAL DAYS
5. BEING SO RESTLESS THAT IT IS HARD TO SIT STILL: NOT AT ALL

## 2024-08-14 ASSESSMENT — PATIENT HEALTH QUESTIONNAIRE - PHQ9
SUM OF ALL RESPONSES TO PHQ QUESTIONS 1-9: 7
6. FEELING BAD ABOUT YOURSELF - OR THAT YOU ARE A FAILURE OR HAVE LET YOURSELF OR YOUR FAMILY DOWN: SEVERAL DAYS
3. TROUBLE FALLING OR STAYING ASLEEP: SEVERAL DAYS
8. MOVING OR SPEAKING SO SLOWLY THAT OTHER PEOPLE COULD HAVE NOTICED. OR THE OPPOSITE, BEING SO FIGETY OR RESTLESS THAT YOU HAVE BEEN MOVING AROUND A LOT MORE THAN USUAL: SEVERAL DAYS
10. IF YOU CHECKED OFF ANY PROBLEMS, HOW DIFFICULT HAVE THESE PROBLEMS MADE IT FOR YOU TO DO YOUR WORK, TAKE CARE OF THINGS AT HOME, OR GET ALONG WITH OTHER PEOPLE: SOMEWHAT DIFFICULT
1. LITTLE INTEREST OR PLEASURE IN DOING THINGS: NOT AT ALL
SUM OF ALL RESPONSES TO PHQ9 QUESTIONS 1 & 2: 1
9. THOUGHTS THAT YOU WOULD BE BETTER OFF DEAD, OR OF HURTING YOURSELF: NOT AT ALL
7. TROUBLE CONCENTRATING ON THINGS, SUCH AS READING THE NEWSPAPER OR WATCHING TELEVISION: SEVERAL DAYS
5. POOR APPETITE OR OVEREATING: SEVERAL DAYS
2. FEELING DOWN, DEPRESSED OR HOPELESS: SEVERAL DAYS
4. FEELING TIRED OR HAVING LITTLE ENERGY: SEVERAL DAYS

## 2024-08-26 ENCOUNTER — DOCUMENTATION (OUTPATIENT)
Dept: PRIMARY CARE | Facility: CLINIC | Age: 44
End: 2024-08-26

## 2024-08-26 ENCOUNTER — APPOINTMENT (OUTPATIENT)
Dept: PRIMARY CARE | Facility: CLINIC | Age: 44
End: 2024-08-26
Payer: MEDICAID

## 2024-08-26 DIAGNOSIS — E61.2 MAGNESIUM DEFICIENCY: ICD-10-CM

## 2024-08-26 DIAGNOSIS — F41.1 GAD (GENERALIZED ANXIETY DISORDER): Primary | ICD-10-CM

## 2024-08-26 PROCEDURE — 99493 SBSQ PSYC COLLAB CARE MGMT: CPT | Performed by: STUDENT IN AN ORGANIZED HEALTH CARE EDUCATION/TRAINING PROGRAM

## 2024-08-26 RX ORDER — LANOLIN ALCOHOL/MO/W.PET/CERES
1 CREAM (GRAM) TOPICAL 2 TIMES DAILY
Qty: 60 TABLET | Refills: 3 | Status: SHIPPED | OUTPATIENT
Start: 2024-08-26

## 2024-09-19 ENCOUNTER — LAB (OUTPATIENT)
Dept: LAB | Facility: LAB | Age: 44
End: 2024-09-19
Payer: MEDICAID

## 2024-09-19 DIAGNOSIS — E11.00 TYPE 2 DIABETES MELLITUS WITH HYPEROSMOLARITY WITHOUT COMA, WITHOUT LONG-TERM CURRENT USE OF INSULIN (MULTI): ICD-10-CM

## 2024-09-19 DIAGNOSIS — I10 HYPERTENSION, UNSPECIFIED TYPE: ICD-10-CM

## 2024-09-19 DIAGNOSIS — N18.9 CHRONIC KIDNEY DISEASE, UNSPECIFIED CKD STAGE: ICD-10-CM

## 2024-09-19 DIAGNOSIS — D64.9 ANEMIA, UNSPECIFIED TYPE: ICD-10-CM

## 2024-09-19 LAB
ALBUMIN SERPL BCP-MCNC: 4.3 G/DL (ref 3.4–5)
ALP SERPL-CCNC: 46 U/L (ref 33–110)
ALT SERPL W P-5'-P-CCNC: 10 U/L (ref 7–45)
ANION GAP SERPL CALC-SCNC: 18 MMOL/L (ref 10–20)
AST SERPL W P-5'-P-CCNC: 15 U/L (ref 9–39)
BILIRUB SERPL-MCNC: 0.3 MG/DL (ref 0–1.2)
BUN SERPL-MCNC: 43 MG/DL (ref 6–23)
CALCIUM SERPL-MCNC: 9.6 MG/DL (ref 8.6–10.6)
CHLORIDE SERPL-SCNC: 101 MMOL/L (ref 98–107)
CHOLEST SERPL-MCNC: 255 MG/DL (ref 0–199)
CHOLESTEROL/HDL RATIO: 3.1
CO2 SERPL-SCNC: 23 MMOL/L (ref 21–32)
CREAT SERPL-MCNC: 1.5 MG/DL (ref 0.5–1.05)
EGFRCR SERPLBLD CKD-EPI 2021: 43 ML/MIN/1.73M*2
ERYTHROCYTE [DISTWIDTH] IN BLOOD BY AUTOMATED COUNT: 18.2 % (ref 11.5–14.5)
EST. AVERAGE GLUCOSE BLD GHB EST-MCNC: 183 MG/DL
GLUCOSE SERPL-MCNC: 209 MG/DL (ref 74–99)
HBA1C MFR BLD: 8 %
HCT VFR BLD AUTO: 32.6 % (ref 36–46)
HDLC SERPL-MCNC: 83.1 MG/DL
HGB BLD-MCNC: 10.5 G/DL (ref 12–16)
LDLC SERPL CALC-MCNC: 137 MG/DL
MCH RBC QN AUTO: 28.3 PG (ref 26–34)
MCHC RBC AUTO-ENTMCNC: 32.2 G/DL (ref 32–36)
MCV RBC AUTO: 88 FL (ref 80–100)
NON HDL CHOLESTEROL: 172 MG/DL (ref 0–149)
NRBC BLD-RTO: 0 /100 WBCS (ref 0–0)
PLATELET # BLD AUTO: 195 X10*3/UL (ref 150–450)
POTASSIUM SERPL-SCNC: 4.3 MMOL/L (ref 3.5–5.3)
PROT SERPL-MCNC: 8.3 G/DL (ref 6.4–8.2)
RBC # BLD AUTO: 3.71 X10*6/UL (ref 4–5.2)
SODIUM SERPL-SCNC: 138 MMOL/L (ref 136–145)
TRIGL SERPL-MCNC: 175 MG/DL (ref 0–149)
VLDL: 35 MG/DL (ref 0–40)
WBC # BLD AUTO: 5.9 X10*3/UL (ref 4.4–11.3)

## 2024-09-19 PROCEDURE — 83036 HEMOGLOBIN GLYCOSYLATED A1C: CPT

## 2024-09-19 PROCEDURE — 36415 COLL VENOUS BLD VENIPUNCTURE: CPT

## 2024-09-19 PROCEDURE — 80061 LIPID PANEL: CPT

## 2024-09-19 PROCEDURE — 85027 COMPLETE CBC AUTOMATED: CPT

## 2024-09-19 PROCEDURE — 80053 COMPREHEN METABOLIC PANEL: CPT

## 2024-09-21 NOTE — RESULT ENCOUNTER NOTE
Hemoglobin A1c at 8.0, trending up from 5 months ago, would recommend she reestablishes with our pharmacy team to optimize medication.  Also, recommend Mediterranean diet, can give nutritionist referral if not already seeing.    Chronic kidney disease is mostly stable, we need to improve blood sugar and blood pressure to prevent this from getting worse.  Hopefully she is still on olmesartan and Farxiga.    LDL at 137, well above diabetic goal of 70.  If she taking rosuvastatin 20 mg daily still?    Mild anemia, stable, likely secondary to chronic diabetes and chronic kidney disease.    Remaining labs unremarkable.

## 2024-09-24 RX ORDER — LOSARTAN POTASSIUM 50 MG/1
1 TABLET ORAL
COMMUNITY
Start: 2024-08-21

## 2024-09-24 NOTE — TELEPHONE ENCOUNTER
Result Communication    Resulted Orders   CBC   Result Value Ref Range    WBC 5.9 4.4 - 11.3 x10*3/uL    nRBC 0.0 0.0 - 0.0 /100 WBCs    RBC 3.71 (L) 4.00 - 5.20 x10*6/uL    Hemoglobin 10.5 (L) 12.0 - 16.0 g/dL    Hematocrit 32.6 (L) 36.0 - 46.0 %    MCV 88 80 - 100 fL    MCH 28.3 26.0 - 34.0 pg    MCHC 32.2 32.0 - 36.0 g/dL    RDW 18.2 (H) 11.5 - 14.5 %    Platelets 195 150 - 450 x10*3/uL   Comprehensive Metabolic Panel   Result Value Ref Range    Glucose 209 (H) 74 - 99 mg/dL    Sodium 138 136 - 145 mmol/L    Potassium 4.3 3.5 - 5.3 mmol/L    Chloride 101 98 - 107 mmol/L    Bicarbonate 23 21 - 32 mmol/L    Anion Gap 18 10 - 20 mmol/L    Urea Nitrogen 43 (H) 6 - 23 mg/dL    Creatinine 1.50 (H) 0.50 - 1.05 mg/dL    eGFR 43 (L) >60 mL/min/1.73m*2      Comment:      Calculations of estimated GFR are performed using the 2021 CKD-EPI Study Refit equation without the race variable for the IDMS-Traceable creatinine methods.  https://jasn.asnjournals.org/content/early/2021/09/22/ASN.0313866122    Calcium 9.6 8.6 - 10.6 mg/dL    Albumin 4.3 3.4 - 5.0 g/dL    Alkaline Phosphatase 46 33 - 110 U/L    Total Protein 8.3 (H) 6.4 - 8.2 g/dL    AST 15 9 - 39 U/L    Bilirubin, Total 0.3 0.0 - 1.2 mg/dL    ALT 10 7 - 45 U/L      Comment:      Patients treated with Sulfasalazine may generate falsely decreased results for ALT.   Lipid Panel   Result Value Ref Range    Cholesterol 255 (H) 0 - 199 mg/dL      Comment:            Age      Desirable   Borderline High   High     0-19 Y     0 - 169       170 - 199     >/= 200    20-24 Y     0 - 189       190 - 224     >/= 225         >24 Y     0 - 199       200 - 239     >/= 240   **All ranges are based on fasting samples. Specific   therapeutic targets will vary based on patient-specific   cardiac risk.    Pediatric guidelines reference:Pediatrics 2011, 128(S5).Adult guidelines reference: NCEP ATPIII Guidelines,DANA 2001, 258:2486-97    Venipuncture immediately after or during the  administration of Metamizole may lead to falsely low results. Testing should be performed immediately prior to Metamizole dosing.    HDL-Cholesterol 83.1 mg/dL      Comment:        Age       Very Low   Low     Normal    High    0-19 Y    < 35      < 40     40-45     ----  20-24 Y    ----     < 40      >45      ----        >24 Y      ----     < 40     40-60      >60      Cholesterol/HDL Ratio 3.1       Comment:        Ref Values  Desirable  < 3.4  High Risk  > 5.0    LDL Calculated 137 (H) <=99 mg/dL      Comment:                                  Near   Borderline      AGE      Desirable  Optimal    High     High     Very High     0-19 Y     0 - 109     ---    110-129   >/= 130     ----    20-24 Y     0 - 119     ---    120-159   >/= 160     ----      >24 Y     0 -  99   100-129  130-159   160-189     >/=190      VLDL 35 0 - 40 mg/dL    Triglycerides 175 (H) 0 - 149 mg/dL      Comment:         Age         Desirable   Borderline High   High     Very High   0 D-90 D    19 - 174         ----         ----        ----  91 D- 9 Y     0 -  74        75 -  99     >/= 100      ----    10-19 Y     0 -  89        90 - 129     >/= 130      ----    20-24 Y     0 - 114       115 - 149     >/= 150      ----         >24 Y     0 - 149       150 - 199    200- 499    >/= 500    Venipuncture immediately after or during the administration of Metamizole may lead to falsely low results. Testing should be performed immediately prior to Metamizole dosing.    Non HDL Cholesterol 172 (H) 0 - 149 mg/dL      Comment:            Age       Desirable   Borderline High   High     Very High     0-19 Y     0 - 119       120 - 144     >/= 145    >/= 160    20-24 Y     0 - 149       150 - 189     >/= 190      ----         >24 Y    30 mg/dL above LDL Cholesterol goal     Hemoglobin A1C   Result Value Ref Range    Hemoglobin A1C 8.0 (H) See comment %    Estimated Average Glucose 183 Not Established mg/dL    Narrative    Diagnosis of  Diabetes-Adults  Non-Diabetic: < or = 5.6%  Increased risk for developing diabetes: 5.7-6.4%  Diagnostic of diabetes: > or = 6.5%           11:07 AM      Results were successfully communicated with the patient and they acknowledged their understanding.

## 2024-09-24 NOTE — TELEPHONE ENCOUNTER
----- Message from Christopher D'Amico sent at 9/21/2024  2:07 PM EDT -----  Hemoglobin A1c at 8.0, trending up from 5 months ago, would recommend she reestablishes with our pharmacy team to optimize medication.  Also, recommend Mediterranean diet, can give nutritionist referral if not already seeing.    Chronic kidney disease is mostly stable, we need to improve blood sugar and blood pressure to prevent this from getting worse.  Hopefully she is still on olmesartan and Farxiga.    LDL at 137, well above diabetic goal of 70.  If she taking rosuvastatin 20 mg daily still?    Mild anemia, stable, likely secondary to chronic diabetes and chronic kidney disease.    Remaining labs unremarkable.

## 2024-09-25 ENCOUNTER — APPOINTMENT (OUTPATIENT)
Dept: PRIMARY CARE | Facility: CLINIC | Age: 44
End: 2024-09-25
Payer: MEDICAID

## 2024-09-25 VITALS
SYSTOLIC BLOOD PRESSURE: 126 MMHG | WEIGHT: 153 LBS | BODY MASS INDEX: 27.1 KG/M2 | DIASTOLIC BLOOD PRESSURE: 85 MMHG | HEART RATE: 89 BPM | OXYGEN SATURATION: 99 %

## 2024-09-25 DIAGNOSIS — N18.30 STAGE 3 CHRONIC KIDNEY DISEASE, UNSPECIFIED WHETHER STAGE 3A OR 3B CKD (MULTI): ICD-10-CM

## 2024-09-25 DIAGNOSIS — D64.9 ANEMIA, UNSPECIFIED TYPE: ICD-10-CM

## 2024-09-25 DIAGNOSIS — E11.00 TYPE 2 DIABETES MELLITUS WITH HYPEROSMOLARITY WITHOUT COMA, WITHOUT LONG-TERM CURRENT USE OF INSULIN (MULTI): ICD-10-CM

## 2024-09-25 DIAGNOSIS — I10 HYPERTENSION, UNSPECIFIED TYPE: Primary | ICD-10-CM

## 2024-09-25 DIAGNOSIS — F41.9 ANXIETY AND DEPRESSION: ICD-10-CM

## 2024-09-25 DIAGNOSIS — F32.A ANXIETY AND DEPRESSION: ICD-10-CM

## 2024-09-25 PROBLEM — E87.5 HYPERKALEMIA: Status: ACTIVE | Noted: 2024-09-25

## 2024-09-25 PROBLEM — A41.9 SEPSIS, UNSPECIFIED ORGANISM (MULTI): Status: ACTIVE | Noted: 2024-09-25

## 2024-09-25 PROBLEM — L89.159 PRESSURE ULCER OF SACRAL REGION, UNSPECIFIED STAGE: Status: ACTIVE | Noted: 2024-09-25

## 2024-09-25 PROBLEM — R06.00 DYSPNEA: Status: ACTIVE | Noted: 2024-09-25

## 2024-09-25 PROBLEM — R06.09 DYSPNEA ON EXERTION: Status: ACTIVE | Noted: 2024-09-25

## 2024-09-25 PROBLEM — H61.20 IMPACTED CERUMEN: Status: ACTIVE | Noted: 2024-09-25

## 2024-09-25 PROBLEM — I50.9 ACUTE ON CHRONIC CONGESTIVE HEART FAILURE: Status: ACTIVE | Noted: 2024-09-25

## 2024-09-25 PROBLEM — J44.9 CHRONIC OBSTRUCTIVE PULMONARY DISEASE, UNSPECIFIED: Status: ACTIVE | Noted: 2024-09-25

## 2024-09-25 PROCEDURE — 3060F POS MICROALBUMINURIA REV: CPT | Performed by: STUDENT IN AN ORGANIZED HEALTH CARE EDUCATION/TRAINING PROGRAM

## 2024-09-25 PROCEDURE — 3052F HG A1C>EQUAL 8.0%<EQUAL 9.0%: CPT | Performed by: STUDENT IN AN ORGANIZED HEALTH CARE EDUCATION/TRAINING PROGRAM

## 2024-09-25 PROCEDURE — 3074F SYST BP LT 130 MM HG: CPT | Performed by: STUDENT IN AN ORGANIZED HEALTH CARE EDUCATION/TRAINING PROGRAM

## 2024-09-25 PROCEDURE — 1036F TOBACCO NON-USER: CPT | Performed by: STUDENT IN AN ORGANIZED HEALTH CARE EDUCATION/TRAINING PROGRAM

## 2024-09-25 PROCEDURE — 99214 OFFICE O/P EST MOD 30 MIN: CPT | Performed by: STUDENT IN AN ORGANIZED HEALTH CARE EDUCATION/TRAINING PROGRAM

## 2024-09-25 PROCEDURE — 4010F ACE/ARB THERAPY RXD/TAKEN: CPT | Performed by: STUDENT IN AN ORGANIZED HEALTH CARE EDUCATION/TRAINING PROGRAM

## 2024-09-25 PROCEDURE — 3079F DIAST BP 80-89 MM HG: CPT | Performed by: STUDENT IN AN ORGANIZED HEALTH CARE EDUCATION/TRAINING PROGRAM

## 2024-09-25 PROCEDURE — 3050F LDL-C >= 130 MG/DL: CPT | Performed by: STUDENT IN AN ORGANIZED HEALTH CARE EDUCATION/TRAINING PROGRAM

## 2024-09-25 RX ORDER — ACETAMINOPHEN 500 MG
TABLET ORAL
COMMUNITY
Start: 2024-09-22

## 2024-09-25 RX ORDER — AMLODIPINE BESYLATE 10 MG/1
1 TABLET ORAL
COMMUNITY
Start: 2024-08-25

## 2024-09-25 RX ORDER — ACETAMINOPHEN 500 MG
TABLET ORAL
COMMUNITY
Start: 2024-05-07

## 2024-09-25 RX ORDER — TRAMADOL HYDROCHLORIDE 50 MG/1
1 TABLET ORAL EVERY 6 HOURS PRN
COMMUNITY
Start: 2024-05-07

## 2024-09-25 ASSESSMENT — COLUMBIA-SUICIDE SEVERITY RATING SCALE - C-SSRS
1. IN THE PAST MONTH, HAVE YOU WISHED YOU WERE DEAD OR WISHED YOU COULD GO TO SLEEP AND NOT WAKE UP?: NO
6. HAVE YOU EVER DONE ANYTHING, STARTED TO DO ANYTHING, OR PREPARED TO DO ANYTHING TO END YOUR LIFE?: NO
2. HAVE YOU ACTUALLY HAD ANY THOUGHTS OF KILLING YOURSELF?: NO

## 2024-09-25 ASSESSMENT — ENCOUNTER SYMPTOMS
OCCASIONAL FEELINGS OF UNSTEADINESS: 0
LOSS OF SENSATION IN FEET: 0
DEPRESSION: 0

## 2024-09-25 NOTE — PROGRESS NOTES
44-year-old female presenting for follow-up on multiple concerns.    DMII  Stable, tolerating regimen well.  Reports home blood sugars are doing well.     CKD  Following with nephrology, stable     Anemia  Stable, minimal.     Constipation  Stable     HTN  Stable, tolerates current regimen well.     Anxiety, depression, external stressors  Improvement with Lexapro, wants to continue current dosing.     SocHx:   - Smoking: Milds daily, probably 2, 25 years or more    Denies HA, changes in vision, chest pain, SOB/VAZUQEZ, palpitations, N/V/D/C, dysuria/hematuria, hematochezia/melena, paresthesias, LE edema.    12 point ROS reviewed and negative other than as stated in HPI      General: Alert, oriented, pleasant, in no acute distress  HEENT:      Head: normocephalic, atraumatic;      eyes: EOMI, no scleral icterus;   Neck: soft, supple, non-tender, no masses appreciated  CV: Heart with regular rate and rhythm, normal S1/S2, no murmurs  Lungs: CTAB without wheezing, rhonchi or rales; good respiratory effort, no increased work of breathing  Neuro: Cranial nerves grossly intact; alert and oriented, normal gait  Psych: Tearful throughout exam    #HTN  - At goal in office  - Continue losartan 50 mg, amlodipine 10 mg qd, chlorthalidone 25 mg qd    #DMII  -Hemoglobin A1c 8.0, 09/2024  - On Victoza and Farxiga  - Ophthalmology referral previous appointment  - Diabetic foot exam negative previous appointment appointment  - Will not make any medication changes, patient admits she is having worsened diet since last visit, notes to be changed, is dealing with financial issues getting healthy food    #HLD  - LDL at 137, reports compliance with rosuvastatin 20 mg daily  - Continue to work on diet, and continue rosuvastatin 20 mg daily    #CKD3a  - Stable  - Continue to follow with nephrology    #Anemia  - Slightly worsening anemia, last hemoglobin 10.5  - Continue to monitor    #Stress #Anxiety #Depression  - Following with  [facility] behavioral health  - Continue Lexapro 10 mg daily    F/U 3-4 months, CPE in January    Chris D'Amico, DO

## 2024-12-03 DIAGNOSIS — E11.00 TYPE 2 DIABETES MELLITUS WITH HYPEROSMOLARITY WITHOUT COMA, WITHOUT LONG-TERM CURRENT USE OF INSULIN (MULTI): ICD-10-CM

## 2024-12-03 RX ORDER — LIRAGLUTIDE 6 MG/ML
INJECTION SUBCUTANEOUS
Qty: 9 ML | Refills: 2 | Status: SHIPPED | OUTPATIENT
Start: 2024-12-03

## 2024-12-16 ENCOUNTER — LAB (OUTPATIENT)
Dept: LAB | Facility: LAB | Age: 44
End: 2024-12-16
Payer: MEDICAID

## 2024-12-16 DIAGNOSIS — E55.9 VITAMIN D DEFICIENCY, UNSPECIFIED: Primary | ICD-10-CM

## 2024-12-16 DIAGNOSIS — E83.42 HYPOMAGNESEMIA: ICD-10-CM

## 2024-12-16 DIAGNOSIS — N18.31 CHRONIC KIDNEY DISEASE, STAGE 3A (MULTI): ICD-10-CM

## 2024-12-16 LAB
25(OH)D3 SERPL-MCNC: 31 NG/ML (ref 30–100)
ALBUMIN SERPL BCP-MCNC: 4.3 G/DL (ref 3.4–5)
ANION GAP SERPL CALC-SCNC: 19 MMOL/L (ref 10–20)
APPEARANCE UR: CLEAR
BILIRUB UR STRIP.AUTO-MCNC: NEGATIVE MG/DL
BUN SERPL-MCNC: 30 MG/DL (ref 6–23)
CALCIUM SERPL-MCNC: 9.5 MG/DL (ref 8.6–10.6)
CHLORIDE SERPL-SCNC: 95 MMOL/L (ref 98–107)
CO2 SERPL-SCNC: 23 MMOL/L (ref 21–32)
COLOR UR: ABNORMAL
CREAT SERPL-MCNC: 1.4 MG/DL (ref 0.5–1.05)
CREAT UR-MCNC: 90.2 MG/DL (ref 20–320)
EGFRCR SERPLBLD CKD-EPI 2021: 47 ML/MIN/1.73M*2
GLUCOSE SERPL-MCNC: 177 MG/DL (ref 74–99)
GLUCOSE UR STRIP.AUTO-MCNC: ABNORMAL MG/DL
KETONES UR STRIP.AUTO-MCNC: ABNORMAL MG/DL
LEUKOCYTE ESTERASE UR QL STRIP.AUTO: ABNORMAL
MAGNESIUM SERPL-MCNC: 2.2 MG/DL (ref 1.6–2.4)
MICROALBUMIN UR-MCNC: 68.5 MG/L
MICROALBUMIN/CREAT UR: 75.9 UG/MG CREAT
NITRITE UR QL STRIP.AUTO: NEGATIVE
PH UR STRIP.AUTO: 5.5 [PH]
PHOSPHATE SERPL-MCNC: 4.7 MG/DL (ref 2.5–4.9)
POTASSIUM SERPL-SCNC: 3.8 MMOL/L (ref 3.5–5.3)
PROT UR STRIP.AUTO-MCNC: NEGATIVE MG/DL
RBC # UR STRIP.AUTO: NEGATIVE /UL
RBC #/AREA URNS AUTO: NORMAL /HPF
SODIUM SERPL-SCNC: 133 MMOL/L (ref 136–145)
SP GR UR STRIP.AUTO: 1.01
URATE SERPL-MCNC: 6.6 MG/DL (ref 2.3–6.7)
UROBILINOGEN UR STRIP.AUTO-MCNC: NORMAL MG/DL
WBC #/AREA URNS AUTO: NORMAL /HPF

## 2024-12-16 PROCEDURE — 81001 URINALYSIS AUTO W/SCOPE: CPT

## 2024-12-16 PROCEDURE — 82306 VITAMIN D 25 HYDROXY: CPT

## 2024-12-16 PROCEDURE — 82043 UR ALBUMIN QUANTITATIVE: CPT

## 2024-12-16 PROCEDURE — 36415 COLL VENOUS BLD VENIPUNCTURE: CPT

## 2024-12-16 PROCEDURE — 83735 ASSAY OF MAGNESIUM: CPT

## 2024-12-16 PROCEDURE — 82570 ASSAY OF URINE CREATININE: CPT

## 2024-12-16 PROCEDURE — 84550 ASSAY OF BLOOD/URIC ACID: CPT

## 2024-12-16 PROCEDURE — 80069 RENAL FUNCTION PANEL: CPT

## 2024-12-19 DIAGNOSIS — K21.9 GASTROESOPHAGEAL REFLUX DISEASE, UNSPECIFIED WHETHER ESOPHAGITIS PRESENT: ICD-10-CM

## 2024-12-21 RX ORDER — PANTOPRAZOLE SODIUM 40 MG/1
TABLET, DELAYED RELEASE ORAL
Qty: 90 TABLET | Refills: 3 | Status: SHIPPED | OUTPATIENT
Start: 2024-12-21

## 2024-12-24 DIAGNOSIS — E61.2 MAGNESIUM DEFICIENCY: ICD-10-CM

## 2024-12-24 RX ORDER — LANOLIN ALCOHOL/MO/W.PET/CERES
1 CREAM (GRAM) TOPICAL 2 TIMES DAILY
Qty: 60 TABLET | Refills: 3 | Status: SHIPPED | OUTPATIENT
Start: 2024-12-24

## 2025-01-15 ENCOUNTER — APPOINTMENT (OUTPATIENT)
Dept: PRIMARY CARE | Facility: CLINIC | Age: 45
End: 2025-01-15
Payer: MEDICAID

## 2025-01-15 ENCOUNTER — LAB (OUTPATIENT)
Dept: LAB | Facility: LAB | Age: 45
End: 2025-01-15
Payer: MEDICAID

## 2025-01-15 VITALS
DIASTOLIC BLOOD PRESSURE: 84 MMHG | SYSTOLIC BLOOD PRESSURE: 130 MMHG | OXYGEN SATURATION: 99 % | WEIGHT: 155 LBS | HEIGHT: 63 IN | BODY MASS INDEX: 27.46 KG/M2 | HEART RATE: 111 BPM

## 2025-01-15 DIAGNOSIS — E11.9 TYPE 2 DIABETES MELLITUS WITHOUT COMPLICATION, WITH LONG-TERM CURRENT USE OF INSULIN (MULTI): ICD-10-CM

## 2025-01-15 DIAGNOSIS — K21.9 GASTROESOPHAGEAL REFLUX DISEASE, UNSPECIFIED WHETHER ESOPHAGITIS PRESENT: ICD-10-CM

## 2025-01-15 DIAGNOSIS — E11.00 TYPE 2 DIABETES MELLITUS WITH HYPEROSMOLARITY WITHOUT COMA, WITHOUT LONG-TERM CURRENT USE OF INSULIN (MULTI): ICD-10-CM

## 2025-01-15 DIAGNOSIS — Z00.00 WELLNESS EXAMINATION: ICD-10-CM

## 2025-01-15 DIAGNOSIS — F41.9 ANXIETY AND DEPRESSION: ICD-10-CM

## 2025-01-15 DIAGNOSIS — F32.A ANXIETY AND DEPRESSION: ICD-10-CM

## 2025-01-15 DIAGNOSIS — N18.30 STAGE 3 CHRONIC KIDNEY DISEASE, UNSPECIFIED WHETHER STAGE 3A OR 3B CKD (MULTI): ICD-10-CM

## 2025-01-15 DIAGNOSIS — Z79.4 TYPE 2 DIABETES MELLITUS WITHOUT COMPLICATION, WITH LONG-TERM CURRENT USE OF INSULIN (MULTI): ICD-10-CM

## 2025-01-15 DIAGNOSIS — E11.00 TYPE 2 DIABETES MELLITUS WITH HYPEROSMOLARITY WITHOUT COMA, WITHOUT LONG-TERM CURRENT USE OF INSULIN (MULTI): Primary | ICD-10-CM

## 2025-01-15 DIAGNOSIS — E78.5 HYPERLIPIDEMIA, UNSPECIFIED HYPERLIPIDEMIA TYPE: ICD-10-CM

## 2025-01-15 DIAGNOSIS — D64.9 ANEMIA, UNSPECIFIED TYPE: ICD-10-CM

## 2025-01-15 DIAGNOSIS — F41.9 ANXIETY: ICD-10-CM

## 2025-01-15 DIAGNOSIS — I10 HYPERTENSION, UNSPECIFIED TYPE: ICD-10-CM

## 2025-01-15 DIAGNOSIS — I10 HYPERTENSION, UNSPECIFIED TYPE: Primary | ICD-10-CM

## 2025-01-15 PROBLEM — I11.0 HYPERTENSIVE HEART DISEASE WITH HEART FAILURE: Status: RESOLVED | Noted: 2023-09-12 | Resolved: 2025-01-15

## 2025-01-15 PROBLEM — I50.9 ACUTE ON CHRONIC CONGESTIVE HEART FAILURE: Status: RESOLVED | Noted: 2024-09-25 | Resolved: 2025-01-15

## 2025-01-15 PROBLEM — J44.9 CHRONIC OBSTRUCTIVE PULMONARY DISEASE, UNSPECIFIED: Status: RESOLVED | Noted: 2024-09-25 | Resolved: 2025-01-15

## 2025-01-15 LAB
ALBUMIN SERPL BCP-MCNC: 4.3 G/DL (ref 3.4–5)
ALP SERPL-CCNC: 36 U/L (ref 33–110)
ALT SERPL W P-5'-P-CCNC: 22 U/L (ref 7–45)
ANION GAP SERPL CALC-SCNC: 16 MMOL/L (ref 10–20)
AST SERPL W P-5'-P-CCNC: 28 U/L (ref 9–39)
BILIRUB SERPL-MCNC: 0.3 MG/DL (ref 0–1.2)
BUN SERPL-MCNC: 32 MG/DL (ref 6–23)
CALCIUM SERPL-MCNC: 9.1 MG/DL (ref 8.6–10.6)
CHLORIDE SERPL-SCNC: 99 MMOL/L (ref 98–107)
CHOLEST SERPL-MCNC: 241 MG/DL (ref 0–199)
CHOLESTEROL/HDL RATIO: 2.4
CO2 SERPL-SCNC: 25 MMOL/L (ref 21–32)
CREAT SERPL-MCNC: 1.47 MG/DL (ref 0.5–1.05)
EGFRCR SERPLBLD CKD-EPI 2021: 44 ML/MIN/1.73M*2
ERYTHROCYTE [DISTWIDTH] IN BLOOD BY AUTOMATED COUNT: 18.5 % (ref 11.5–14.5)
GLUCOSE SERPL-MCNC: 182 MG/DL (ref 74–99)
HCT VFR BLD AUTO: 32.9 % (ref 36–46)
HDLC SERPL-MCNC: 100.8 MG/DL
HGB BLD-MCNC: 11.1 G/DL (ref 12–16)
LDLC SERPL CALC-MCNC: 116 MG/DL
MCH RBC QN AUTO: 28.5 PG (ref 26–34)
MCHC RBC AUTO-ENTMCNC: 33.7 G/DL (ref 32–36)
MCV RBC AUTO: 85 FL (ref 80–100)
NON HDL CHOLESTEROL: 140 MG/DL (ref 0–149)
NRBC BLD-RTO: 0 /100 WBCS (ref 0–0)
PLATELET # BLD AUTO: 141 X10*3/UL (ref 150–450)
POTASSIUM SERPL-SCNC: 4.4 MMOL/L (ref 3.5–5.3)
PROT SERPL-MCNC: 8.5 G/DL (ref 6.4–8.2)
RBC # BLD AUTO: 3.89 X10*6/UL (ref 4–5.2)
SODIUM SERPL-SCNC: 136 MMOL/L (ref 136–145)
TRIGL SERPL-MCNC: 122 MG/DL (ref 0–149)
VLDL: 24 MG/DL (ref 0–40)
WBC # BLD AUTO: 4.2 X10*3/UL (ref 4.4–11.3)

## 2025-01-15 PROCEDURE — 99214 OFFICE O/P EST MOD 30 MIN: CPT | Performed by: STUDENT IN AN ORGANIZED HEALTH CARE EDUCATION/TRAINING PROGRAM

## 2025-01-15 PROCEDURE — 99396 PREV VISIT EST AGE 40-64: CPT | Performed by: STUDENT IN AN ORGANIZED HEALTH CARE EDUCATION/TRAINING PROGRAM

## 2025-01-15 PROCEDURE — 80061 LIPID PANEL: CPT

## 2025-01-15 PROCEDURE — 80053 COMPREHEN METABOLIC PANEL: CPT

## 2025-01-15 PROCEDURE — 3079F DIAST BP 80-89 MM HG: CPT | Performed by: STUDENT IN AN ORGANIZED HEALTH CARE EDUCATION/TRAINING PROGRAM

## 2025-01-15 PROCEDURE — 4010F ACE/ARB THERAPY RXD/TAKEN: CPT | Performed by: STUDENT IN AN ORGANIZED HEALTH CARE EDUCATION/TRAINING PROGRAM

## 2025-01-15 PROCEDURE — 1036F TOBACCO NON-USER: CPT | Performed by: STUDENT IN AN ORGANIZED HEALTH CARE EDUCATION/TRAINING PROGRAM

## 2025-01-15 PROCEDURE — 3075F SYST BP GE 130 - 139MM HG: CPT | Performed by: STUDENT IN AN ORGANIZED HEALTH CARE EDUCATION/TRAINING PROGRAM

## 2025-01-15 PROCEDURE — 85027 COMPLETE CBC AUTOMATED: CPT

## 2025-01-15 PROCEDURE — 83036 HEMOGLOBIN GLYCOSYLATED A1C: CPT

## 2025-01-15 PROCEDURE — 3008F BODY MASS INDEX DOCD: CPT | Performed by: STUDENT IN AN ORGANIZED HEALTH CARE EDUCATION/TRAINING PROGRAM

## 2025-01-15 RX ORDER — ESCITALOPRAM OXALATE 20 MG/1
20 TABLET ORAL DAILY
Qty: 90 TABLET | Refills: 1 | Status: SHIPPED | OUTPATIENT
Start: 2025-01-15 | End: 2025-07-14

## 2025-01-15 RX ORDER — PANTOPRAZOLE SODIUM 40 MG/1
TABLET, DELAYED RELEASE ORAL
Qty: 90 TABLET | Refills: 1 | Status: SHIPPED | OUTPATIENT
Start: 2025-01-15

## 2025-01-15 RX ORDER — ROSUVASTATIN CALCIUM 20 MG/1
20 TABLET, COATED ORAL DAILY
Qty: 90 TABLET | Refills: 1 | Status: SHIPPED | OUTPATIENT
Start: 2025-01-15 | End: 2025-07-14

## 2025-01-15 RX ORDER — GABAPENTIN 100 MG/1
100 CAPSULE ORAL EVERY 12 HOURS
Qty: 180 CAPSULE | Refills: 1 | Status: SHIPPED | OUTPATIENT
Start: 2025-01-15 | End: 2025-07-14

## 2025-01-15 RX ORDER — CHLORTHALIDONE 25 MG/1
25 TABLET ORAL DAILY
Qty: 90 TABLET | Refills: 1 | Status: SHIPPED | OUTPATIENT
Start: 2025-01-15 | End: 2025-07-14

## 2025-01-15 RX ORDER — DAPAGLIFLOZIN 10 MG/1
10 TABLET, FILM COATED ORAL DAILY
Qty: 90 TABLET | Refills: 1 | Status: SHIPPED | OUTPATIENT
Start: 2025-01-15 | End: 2025-07-14

## 2025-01-15 RX ORDER — AMLODIPINE BESYLATE 10 MG/1
10 TABLET ORAL
Qty: 90 TABLET | Refills: 1 | Status: SHIPPED | OUTPATIENT
Start: 2025-01-15 | End: 2025-07-14

## 2025-01-15 RX ORDER — LOSARTAN POTASSIUM 50 MG/1
50 TABLET ORAL
Qty: 90 TABLET | Refills: 1 | Status: SHIPPED | OUTPATIENT
Start: 2025-01-15 | End: 2025-07-14

## 2025-01-15 ASSESSMENT — PATIENT HEALTH QUESTIONNAIRE - PHQ9
2. FEELING DOWN, DEPRESSED OR HOPELESS: NOT AT ALL
1. LITTLE INTEREST OR PLEASURE IN DOING THINGS: NOT AT ALL
SUM OF ALL RESPONSES TO PHQ9 QUESTIONS 1 AND 2: 0

## 2025-01-15 ASSESSMENT — ENCOUNTER SYMPTOMS
LOSS OF SENSATION IN FEET: 0
DEPRESSION: 0
OCCASIONAL FEELINGS OF UNSTEADINESS: 0

## 2025-01-15 NOTE — PROGRESS NOTES
44-year-old female presenting for follow-up on multiple concerns, CPE.    DMII  Stable, tolerating regimen well.  Is no longer on Victoza due to ambulatory shortages?  Had allergic response to Trulicity in the past with rash.  Has never tried Ozempic or Mounjaro.     CKD  Following with nephrology, stable     Anemia  Stable, minimal.     Constipation  Significantly worsened by iron.  Does not take supplemental fiber.     HTN  Stable, tolerates current regimen well.     Anxiety, depression, external stressors  Improvement with Lexapro previously, not so sure it is fully effective now.  Is willing to do higher dosing.     SocHx:   - Smoking: Milds daily, probably 2, 25 years or more    Denies HA, changes in vision, chest pain, SOB/VAZQUEZ, palpitations, N/V/D/C, dysuria/hematuria, hematochezia/melena, paresthesias, LE edema.    12 point ROS reviewed and negative other than as stated in HPI    General: Alert, oriented, pleasant, in no acute distress  HEENT:      Head: normocephalic, atraumatic;      eyes: EOMI, no scleral icterus;   CV: Heart with regular rate and rhythm, normal S1/S2, no murmurs  Lungs: CTAB without wheezing, rhonchi or rales; good respiratory effort, no increased work of breathing  Abdomen: soft, non-tender, non-distended, no masses appreciated  Extremities: no edema, no cyanosis  Neuro: Cranial nerves grossly intact; alert and oriented  Psych: Appropriate mood and affect    #HM  -CBC, CMP, lipid panel, A1c  -Vaccines:       Flu: Recommended, declines      Shingrix: at 50      Pneumococcal: at 50      Tdap: Recommended  -Truong w/ navid: Recommended, declined  -Last PAP: Follows with GYN  -Lung cancer screening with low-dose CT: Milds daily, 25-30 years  -Colonoscopy: Recommended, declined  -Osteoporosis screening: at 65    #HTN  - Essentially at goal in office  - Continue losartan 50 mg, amlodipine 10 mg qd, chlorthalidone 25 mg qd  - Repeat CMP    #DMII  -Hemoglobin A1c 8.0, 09/2024  - On Farxiga 10 mg  daily, no longer taking Victoza due to inflammatory issues?  - Ophthalmology referral given  - Diabetic foot exam negative 01/2025  - Pharmacist to meet with patient today to work on strategy for restarting GLP-1, whether Victoza or potentially Mounjaro or Ozempic    #Diabetic neuropathy  -Continue gabapentin 100 mg twice daily as needed    #HLD  - Was compliant with rosuvastatin 20 mg daily, reports being out of medication, will restart  - Repeat lipid panel    #CKD3a  - Stable  - Continue to follow with nephrology    #Anemia  - Repeat CBC    #Stress #Anxiety #Depression  - Following with [facility]  - Increase Lexapro to 20 mg daily    F/U 3-6 months, sooner if indicated    Chris D'Amico, DO

## 2025-01-16 ENCOUNTER — TELEMEDICINE (OUTPATIENT)
Dept: PHARMACY | Facility: HOSPITAL | Age: 45
End: 2025-01-16
Payer: MEDICAID

## 2025-01-16 DIAGNOSIS — E11.00 TYPE 2 DIABETES MELLITUS WITH HYPEROSMOLARITY WITHOUT COMA, WITHOUT LONG-TERM CURRENT USE OF INSULIN (MULTI): Primary | ICD-10-CM

## 2025-01-16 LAB
EST. AVERAGE GLUCOSE BLD GHB EST-MCNC: 163 MG/DL
HBA1C MFR BLD: 7.3 %

## 2025-01-16 RX ORDER — METFORMIN HYDROCHLORIDE 500 MG/1
500 TABLET, EXTENDED RELEASE ORAL
Qty: 90 TABLET | Refills: 1 | Status: SHIPPED | OUTPATIENT
Start: 2025-01-16

## 2025-01-16 NOTE — PROGRESS NOTES
Pharmacist Clinic: Diabetes Management  Jennifer Overton is a 44 y.o. female was referred to Clinical Pharmacy Team for diabetes management. At last visit, no medication changes were made. However, since last visit, patient got her lab work done.     Referring Provider: Christopher D'Amico, DO     HISTORY OF PRESENT ILLNESS   Date of diagnosis: 2021  History of illness:   - Patient was hospitalized in may of 2023 for DKA with an A1c of 16.5%, her only medication at this time was metformin, and discharged on insulin  - 1 month later she was hospitalized for hypoglycemia, her A1c 28 days post 16.5% was 8.9%   - Patient was established with pharmacy team, she was comfortable on victoza and farxiga and discharged from pharmacy services due to controlled DM   - Patient then went without Victoza and has been re-referred to pharmacy team     LAB REVIEW   Glucose (mg/dL)   Date Value   01/15/2025 182 (H)   12/16/2024 177 (H)   09/19/2024 209 (H)     Hemoglobin A1C (%)   Date Value   01/15/2025 7.3 (H)   09/19/2024 8.0 (H)   04/05/2024 7.3 (H)     Bicarbonate (mmol/L)   Date Value   01/15/2025 25   12/16/2024 23   09/19/2024 23     Urea Nitrogen (mg/dL)   Date Value   01/15/2025 32 (H)   12/16/2024 30 (H)   09/19/2024 43 (H)     Creatinine (mg/dL)   Date Value   01/15/2025 1.47 (H)   12/16/2024 1.40 (H)   09/19/2024 1.50 (H)     Lab Results   Component Value Date    HGBA1C 7.3 (H) 01/15/2025    HGBA1C 8.0 (H) 09/19/2024    HGBA1C 7.3 (H) 04/05/2024     Lab Results   Component Value Date    CHOL 241 (H) 01/15/2025    CHOL 255 (H) 09/19/2024    CHOL 211 (H) 04/05/2024     Lab Results   Component Value Date    .8 01/15/2025    HDL 83.1 09/19/2024    HDL 85.7 04/05/2024     Lab Results   Component Value Date    LDLCALC 116 (H) 01/15/2025    LDLCALC 137 (H) 09/19/2024    LDLCALC 92 04/05/2024     Lab Results   Component Value Date    TRIG 122 01/15/2025    TRIG 175 (H) 09/19/2024    TRIG 167 (H) 04/05/2024       DIABETES  ASSESSMENT    CURRENT PHARMACOTHERAPY  - Farxiga 10 mg once daily     SECONDARY PREVENTION  - Statin? Yes; rosuvastatin on med list however patient has not been taking the medication  - ACE-I/ARB? Yes; olmesartan on med list    HISTORICAL PHARMACOTHERAPY  - metformin: patient stopped taking this b/c she had ulcers on her butt and she did not want diarrhea to get in it   - trulicity: patient developed hives from the medication, she states she had a hard time breathing and in turn went tot he hospital for further assistance   - jardiance: patient never started the medication  - victoza 0.6 mg under the skin once daily: patient stopped getting the medicine in November 2024    SMBG:   - between 110-140 throughout the day    DISCUSSION:   Patients A1c was 7.3% (goal under 7%)  The past 3 months, she has only taken farxiga   Discussed our A1c is close to goal, but we have some room for improvement:   Discussed options:   Metformin: patient is comfortable starting 1 tab once daily and we will adjust the dose based on tolerability   Victoza: she would prefer to not start on an injection on this moment in time     RECOMMENDATIONS/PLAN  1. Patients diabetes is poorly controlled with most recent A1c of 8.9%, down from 16.5% one month prior (goal < 7 %).   - Continue all meds under the continuation of care with the referring provider and clinical pharmacy team.   - Initiate metformin  mg once daily.     Clinical Pharmacist follow up: 4 weeks    Thank you,  Shandra Cuellar, PharmD    Verbal consent to manage patient's drug therapy was obtained from the patient. They were informed they may decline to participate or withdraw from participation in pharmacy services at any time.

## 2025-01-16 NOTE — RESULT ENCOUNTER NOTE
Hemoglobin A1c at 7.3, better than we thought it was going to be.  Continue with Shandra and upcoming medication changes.  Would still like to get her on a GLP-1.    LDL is at 116, above diabetic goal of 70.  Restart statin, as discussed in office.    Moderate kidney dysfunction, stable, will continue to monitor.  Important to keep blood pressure and blood sugar under control.    Borderline anemia, likely secondary to chronic diabetes and kidney disease.    Remaining labs unremarkable.

## 2025-01-20 ENCOUNTER — TELEPHONE (OUTPATIENT)
Dept: PRIMARY CARE | Facility: CLINIC | Age: 45
End: 2025-01-20
Payer: MEDICAID

## 2025-01-20 NOTE — TELEPHONE ENCOUNTER
Result Communication    Resulted Orders   CBC   Result Value Ref Range    WBC 4.2 (L) 4.4 - 11.3 x10*3/uL    nRBC 0.0 0.0 - 0.0 /100 WBCs    RBC 3.89 (L) 4.00 - 5.20 x10*6/uL    Hemoglobin 11.1 (L) 12.0 - 16.0 g/dL    Hematocrit 32.9 (L) 36.0 - 46.0 %    MCV 85 80 - 100 fL    MCH 28.5 26.0 - 34.0 pg    MCHC 33.7 32.0 - 36.0 g/dL    RDW 18.5 (H) 11.5 - 14.5 %    Platelets 141 (L) 150 - 450 x10*3/uL   Comprehensive Metabolic Panel   Result Value Ref Range    Glucose 182 (H) 74 - 99 mg/dL    Sodium 136 136 - 145 mmol/L    Potassium 4.4 3.5 - 5.3 mmol/L    Chloride 99 98 - 107 mmol/L    Bicarbonate 25 21 - 32 mmol/L    Anion Gap 16 10 - 20 mmol/L    Urea Nitrogen 32 (H) 6 - 23 mg/dL    Creatinine 1.47 (H) 0.50 - 1.05 mg/dL    eGFR 44 (L) >60 mL/min/1.73m*2      Comment:      Calculations of estimated GFR are performed using the 2021 CKD-EPI Study Refit equation without the race variable for the IDMS-Traceable creatinine methods.  https://jasn.asnjournals.org/content/early/2021/09/22/ASN.5034224670    Calcium 9.1 8.6 - 10.6 mg/dL    Albumin 4.3 3.4 - 5.0 g/dL    Alkaline Phosphatase 36 33 - 110 U/L    Total Protein 8.5 (H) 6.4 - 8.2 g/dL    AST 28 9 - 39 U/L    Bilirubin, Total 0.3 0.0 - 1.2 mg/dL    ALT 22 7 - 45 U/L      Comment:      Patients treated with Sulfasalazine may generate falsely decreased results for ALT.   Lipid Panel   Result Value Ref Range    Cholesterol 241 (H) 0 - 199 mg/dL      Comment:            Age      Desirable   Borderline High   High     0-19 Y     0 - 169       170 - 199     >/= 200    20-24 Y     0 - 189       190 - 224     >/= 225         >24 Y     0 - 199       200 - 239     >/= 240   **All ranges are based on fasting samples. Specific   therapeutic targets will vary based on patient-specific   cardiac risk.    Pediatric guidelines reference:Pediatrics 2011, 128(S5).Adult guidelines reference: NCEP ATPIII Guidelines,DANA 2001, 258:2486-97    Venipuncture immediately after or during the  administration of Metamizole may lead to falsely low results. Testing should be performed immediately prior to Metamizole dosing.    HDL-Cholesterol 100.8 mg/dL      Comment:        Age       Very Low   Low     Normal    High    0-19 Y    < 35      < 40     40-45     ----  20-24 Y    ----     < 40      >45      ----        >24 Y      ----     < 40     40-60      >60      Cholesterol/HDL Ratio 2.4       Comment:        Ref Values  Desirable  < 3.4  High Risk  > 5.0    LDL Calculated 116 (H) <=99 mg/dL      Comment:                                  Near   Borderline      AGE      Desirable  Optimal    High     High     Very High     0-19 Y     0 - 109     ---    110-129   >/= 130     ----    20-24 Y     0 - 119     ---    120-159   >/= 160     ----      >24 Y     0 -  99   100-129  130-159   160-189     >/=190      VLDL 24 0 - 40 mg/dL    Triglycerides 122 0 - 149 mg/dL      Comment:      Age              Desirable        Borderline         High        Very High  SEX:B           mg/dL             mg/dL               mg/dL      mg/dL  <=14D                       ----               ----        ----  15D-365D                    ----               ----        ----  1Y-9Y           0-74               75-99             >=100       ----  10Y-19Y        0-89                            >=130       ----  20Y-24Y        0-114             115-149             >=150      ----  >= 25Y         0-149             150-199             200-499    >=500      Venipuncture immediately after or during the administration of Metamizole may lead to falsely low results. Testing should be performed immediately prior to Metamizole dosing.    Non HDL Cholesterol 140 0 - 149 mg/dL      Comment:            Age       Desirable   Borderline High   High     Very High     0-19 Y     0 - 119       120 - 144     >/= 145    >/= 160    20-24 Y     0 - 149       150 - 189     >/= 190      ----         >24 Y    30 mg/dL above LDL Cholesterol  goal     Hemoglobin A1C   Result Value Ref Range    Hemoglobin A1C 7.3 (H) See comment %    Estimated Average Glucose 163 Not Established mg/dL    Narrative    Diagnosis of Diabetes-Adults  Non-Diabetic: < or = 5.6%  Increased risk for developing diabetes: 5.7-6.4%  Diagnostic of diabetes: > or = 6.5%           11:39 AM      Results were successfully communicated with the patient and they acknowledged their understanding.

## 2025-01-20 NOTE — TELEPHONE ENCOUNTER
----- Message from Christopher D'Amico sent at 1/16/2025  1:02 PM EST -----  Hemoglobin A1c at 7.3, better than we thought it was going to be.  Continue with Shandra and upcoming medication changes.  Would still like to get her on a GLP-1.    LDL is at 116, above diabetic goal of 70.  Restart statin, as discussed in office.    Moderate kidney dysfunction, stable, will continue to monitor.  Important to keep blood pressure and blood sugar under control.    Borderline anemia, likely secondary to chronic diabetes and kidney disease.    Remaining labs unremarkable.

## 2025-02-13 ENCOUNTER — APPOINTMENT (OUTPATIENT)
Dept: PHARMACY | Facility: HOSPITAL | Age: 45
End: 2025-02-13
Payer: MEDICAID

## 2025-02-13 DIAGNOSIS — E11.00 TYPE 2 DIABETES MELLITUS WITH HYPEROSMOLARITY WITHOUT COMA, WITHOUT LONG-TERM CURRENT USE OF INSULIN (MULTI): Primary | ICD-10-CM

## 2025-02-13 NOTE — PROGRESS NOTES
Pharmacist Clinic: Diabetes Management  Jennifer Overton is a 44 y.o. female was referred to Clinical Pharmacy Team for diabetes management. At last visit, no medication changes were made. However, since last visit, patient got her lab work done.     Referring Provider: Christopher D'Amico, DO     HISTORY OF PRESENT ILLNESS   Date of diagnosis: 2021  History of illness:   - Patient was hospitalized in may of 2023 for DKA with an A1c of 16.5%, her only medication at this time was metformin, and discharged on insulin  - 1 month later she was hospitalized for hypoglycemia, her A1c 28 days post 16.5% was 8.9%   - Patient was established with pharmacy team, she was comfortable on victoza and farxiga and discharged from pharmacy services due to controlled DM   - Patient then went without Victoza and has been re-referred to pharmacy team     Diet:   - 2 meals per day, patient cut out bread and added sugars   - Coffee in the AM, water throughout the day     LAB REVIEW   Glucose (mg/dL)   Date Value   01/15/2025 182 (H)   12/16/2024 177 (H)   09/19/2024 209 (H)     Hemoglobin A1C (%)   Date Value   01/15/2025 7.3 (H)   09/19/2024 8.0 (H)   04/05/2024 7.3 (H)     Bicarbonate (mmol/L)   Date Value   01/15/2025 25   12/16/2024 23   09/19/2024 23     Urea Nitrogen (mg/dL)   Date Value   01/15/2025 32 (H)   12/16/2024 30 (H)   09/19/2024 43 (H)     Creatinine (mg/dL)   Date Value   01/15/2025 1.47 (H)   12/16/2024 1.40 (H)   09/19/2024 1.50 (H)     Lab Results   Component Value Date    HGBA1C 7.3 (H) 01/15/2025    HGBA1C 8.0 (H) 09/19/2024    HGBA1C 7.3 (H) 04/05/2024     Lab Results   Component Value Date    CHOL 241 (H) 01/15/2025    CHOL 255 (H) 09/19/2024    CHOL 211 (H) 04/05/2024     Lab Results   Component Value Date    .8 01/15/2025    HDL 83.1 09/19/2024    HDL 85.7 04/05/2024     Lab Results   Component Value Date    LDLCALC 116 (H) 01/15/2025    LDLCALC 137 (H) 09/19/2024    LDLCALC 92 04/05/2024     Lab Results    Component Value Date    TRIG 122 01/15/2025    TRIG 175 (H) 09/19/2024    TRIG 167 (H) 04/05/2024       Albumin/Creatinine Ratio   Date Value Ref Range Status   12/16/2024 75.9 (H) <30.0 ug/mg Creat Final   08/13/2024 34.0 (H) <30.0 ug/mg Creat Final   01/09/2024 8.4 <30.0 ug/mg Creat Final     DIABETES ASSESSMENT    CURRENT PHARMACOTHERAPY  - Metformin  mg once daily   - Farxiga 10 mg once daily     SECONDARY PREVENTION  - Statin? Yes; rosuvastatin on med list however patient has not been taking the medication  - ACE-I/ARB? Yes; olmesartan on med list    HISTORICAL PHARMACOTHERAPY  - metformin: patient stopped taking this b/c she had ulcers on her butt and she did not want diarrhea to get in it   - trulicity: patient developed hives from the medication, she states she had a hard time breathing and in turn went tot [facility] for further assistance   - jardiance: patient never started the medication  - victoza 0.6 mg under the skin once daily: patient stopped getting the medicine in November 2024    SMBG:   - all numbers around 100-110   - highest she has seen was 146 before bed and she ate canned pears     DISCUSSION:   Patients A1c was 7.3% (goal under 7%)  The past 3 months, she has only taken farxiga   Discussed our A1c is close to goal, but we have some room for improvement:   Discussed current medication regimen:   Patient is tolerating farxiga 10 mg once daily   Patient is tolerating metformin  mg once daily  Discussed options: blood glucose readings look great!   Metformin: patient does not want to make any changes at this time  Victoza: she would prefer to not start on an injection on this moment in time     RECOMMENDATIONS/PLAN  1. Patients diabetes is improving significantly with most recent A1c of 7.3%, down from 16.5% one month prior (goal < 7 %).   - Continue all meds under the continuation of care with the referring provider and clinical pharmacy team.     Clinical Pharmacist follow up:  2 months, patient will be due for a new A1c    Thank you,  Shandra Cuellar, PharmD    Verbal consent to manage patient's drug therapy was obtained from the patient. They were informed they may decline to participate or withdraw from participation in pharmacy services at any time.

## 2025-04-10 ENCOUNTER — APPOINTMENT (OUTPATIENT)
Dept: PHARMACY | Facility: HOSPITAL | Age: 45
End: 2025-04-10
Payer: MEDICAID

## 2025-04-10 DIAGNOSIS — E61.2 MAGNESIUM DEFICIENCY: ICD-10-CM

## 2025-04-10 DIAGNOSIS — E11.00 TYPE 2 DIABETES MELLITUS WITH HYPEROSMOLARITY WITHOUT COMA, WITHOUT LONG-TERM CURRENT USE OF INSULIN (MULTI): Primary | ICD-10-CM

## 2025-04-10 NOTE — PROGRESS NOTES
Pharmacist Clinic: Diabetes Management  Jennifer Overton is a 44 y.o. female was referred to Clinical Pharmacy Team for diabetes management. At last visit, no medication changes were made. However, since last visit, patient got her lab work done.     Referring Provider: Christopher D'Amico, DO     HISTORY OF PRESENT ILLNESS   Date of diagnosis: 2021  History of illness:   - Patient was hospitalized in may of 2023 for DKA with an A1c of 16.5%, her only medication at this time was metformin, and discharged on insulin  - 1 month later she was hospitalized for hypoglycemia, her A1c 28 days post 16.5% was 8.9%   - Patient was established with pharmacy team, she was comfortable on victoza and farxiga and discharged from pharmacy services due to controlled DM   - Patient then went without Victoza and has been re-referred to pharmacy team     Diet:   - 2 meals per day, patient cut out bread and added sugars   - Coffee in the AM, water throughout the day     LAB REVIEW   Glucose (mg/dL)   Date Value   01/15/2025 182 (H)   12/16/2024 177 (H)   09/19/2024 209 (H)     Hemoglobin A1C (%)   Date Value   01/15/2025 7.3 (H)   09/19/2024 8.0 (H)   04/05/2024 7.3 (H)     Bicarbonate (mmol/L)   Date Value   01/15/2025 25   12/16/2024 23   09/19/2024 23     Urea Nitrogen (mg/dL)   Date Value   01/15/2025 32 (H)   12/16/2024 30 (H)   09/19/2024 43 (H)     Creatinine (mg/dL)   Date Value   01/15/2025 1.47 (H)   12/16/2024 1.40 (H)   09/19/2024 1.50 (H)     Lab Results   Component Value Date    HGBA1C 7.3 (H) 01/15/2025    HGBA1C 8.0 (H) 09/19/2024    HGBA1C 7.3 (H) 04/05/2024     Lab Results   Component Value Date    CHOL 241 (H) 01/15/2025    CHOL 255 (H) 09/19/2024    CHOL 211 (H) 04/05/2024     Lab Results   Component Value Date    .8 01/15/2025    HDL 83.1 09/19/2024    HDL 85.7 04/05/2024     Lab Results   Component Value Date    LDLCALC 116 (H) 01/15/2025    LDLCALC 137 (H) 09/19/2024    LDLCALC 92 04/05/2024     Lab Results    Component Value Date    TRIG 122 01/15/2025    TRIG 175 (H) 09/19/2024    TRIG 167 (H) 04/05/2024       Albumin/Creatinine Ratio   Date Value Ref Range Status   12/16/2024 75.9 (H) <30.0 ug/mg Creat Final   08/13/2024 34.0 (H) <30.0 ug/mg Creat Final   01/09/2024 8.4 <30.0 ug/mg Creat Final     DIABETES ASSESSMENT    CURRENT PHARMACOTHERAPY  - Metformin  mg once daily   - Farxiga 10 mg once daily     SECONDARY PREVENTION  - Statin? Yes; rosuvastatin on med list however patient has not been taking the medication  - ACE-I/ARB? Yes; olmesartan on med list    HISTORICAL PHARMACOTHERAPY  - metformin: patient stopped taking this b/c she had ulcers on her butt and she did not want diarrhea to get in it   - trulicity: patient developed hives from the medication, she states she had a hard time breathing and in turn went tot [facility] for further assistance   - jardiance: patient never started the medication  - victoza 0.6 mg under the skin once daily: patient stopped getting the medicine in November 2024    SMBG:   - all numbers around 100-110   - highest she has seen was 146 before bed and she ate canned pears     DISCUSSION:   Patients A1c was 7.3% (goal under 7%)  The past 3 months, she has only taken farxiga   Discussed our A1c is close to goal, but we have some room for improvement:   Discussed current medication regimen:   Patient is tolerating farxiga 10 mg once daily   Patient is tolerating metformin  mg once daily  Discussed options: blood glucose readings look great!   Metformin: patient does not want to make any changes at this time  Victoza: she would prefer to not start on an injection on this moment in time     RECOMMENDATIONS/PLAN  1. Patients diabetes is improving significantly with most recent A1c of 7.3%, down from 16.5% one month prior (goal < 7 %).   - Continue all meds under the continuation of care with the referring provider and clinical pharmacy team.     Clinical Pharmacist follow up:  2 weeks, patient will be due for a new A1c    Thank you,  Shandra Cuellar, PharmD    Verbal consent to manage patient's drug therapy was obtained from the patient. They were informed they may decline to participate or withdraw from participation in pharmacy services at any time.

## 2025-04-23 LAB
25(OH)D3+25(OH)D2 SERPL-MCNC: 25 NG/ML (ref 30–100)
EST. AVERAGE GLUCOSE BLD GHB EST-MCNC: 157 MG/DL
EST. AVERAGE GLUCOSE BLD GHB EST-SCNC: 8.7 MMOL/L
HBA1C MFR BLD: 7.1 %

## 2025-04-24 ENCOUNTER — TELEMEDICINE (OUTPATIENT)
Dept: PHARMACY | Facility: HOSPITAL | Age: 45
End: 2025-04-24
Payer: MEDICAID

## 2025-04-24 DIAGNOSIS — E61.2 MAGNESIUM DEFICIENCY: ICD-10-CM

## 2025-04-24 DIAGNOSIS — E11.00 TYPE 2 DIABETES MELLITUS WITH HYPEROSMOLARITY WITHOUT COMA, WITHOUT LONG-TERM CURRENT USE OF INSULIN (MULTI): ICD-10-CM

## 2025-04-24 RX ORDER — LANOLIN ALCOHOL/MO/W.PET/CERES
1 CREAM (GRAM) TOPICAL DAILY
Qty: 90 TABLET | Refills: 0 | Status: SHIPPED | OUTPATIENT
Start: 2025-04-24

## 2025-04-24 RX ORDER — CHOLECALCIFEROL (VITAMIN D3) 25 MCG
25 TABLET ORAL DAILY
Qty: 90 TABLET | Refills: 0 | Status: SHIPPED | OUTPATIENT
Start: 2025-04-24 | End: 2026-04-24

## 2025-04-24 NOTE — PROGRESS NOTES
Pharmacist Clinic: Diabetes Management  Jennifer Overton is a 44 y.o. female was referred to Clinical Pharmacy Team for diabetes management.     Referring Provider: Christopher D'Amico, DO    HISTORY OF PRESENT ILLNESS   Date of diagnosis: 2021  History of illness:   - Patient was hospitalized in may of 2023 for DKA with an A1c of 16.5%, her only medication at this time was metformin, and discharged on insulin  - 1 month later she was hospitalized for hypoglycemia, her A1c 28 days post 16.5% was 8.9%   - Patient was established with pharmacy team, she was comfortable on victoza and farxiga and discharged from pharmacy services due to controlled DM   - Patient then went without Victoza and has been re-referred to pharmacy team due to an elevated A1c    Diet:   - 2 meals per day, patient cut out bread and added sugars   - Coffee in the AM, water throughout the day   - Since starting to work with the pharmacy team, she has incorporated listening to her body and only eating when she is hungry      LAB REVIEW   Glucose (mg/dL)   Date Value   01/15/2025 182 (H)   12/16/2024 177 (H)   09/19/2024 209 (H)     HEMOGLOBIN A1c (%)   Date Value   04/22/2025 7.1 (H)     Hemoglobin A1C (%)   Date Value   01/15/2025 7.3 (H)   09/19/2024 8.0 (H)   04/05/2024 7.3 (H)     Bicarbonate (mmol/L)   Date Value   01/15/2025 25   12/16/2024 23   09/19/2024 23     Urea Nitrogen (mg/dL)   Date Value   01/15/2025 32 (H)   12/16/2024 30 (H)   09/19/2024 43 (H)     Creatinine (mg/dL)   Date Value   01/15/2025 1.47 (H)   12/16/2024 1.40 (H)   09/19/2024 1.50 (H)     Lab Results   Component Value Date    HGBA1C 7.1 (H) 04/22/2025    HGBA1C 7.3 (H) 01/15/2025    HGBA1C 8.0 (H) 09/19/2024     Lab Results   Component Value Date    CHOL 241 (H) 01/15/2025    CHOL 255 (H) 09/19/2024    CHOL 211 (H) 04/05/2024     Lab Results   Component Value Date    .8 01/15/2025    HDL 83.1 09/19/2024    HDL 85.7 04/05/2024     Lab Results   Component Value Date     LDLCALC 116 (H) 01/15/2025    LDLCALC 137 (H) 09/19/2024    LDLCALC 92 04/05/2024     Lab Results   Component Value Date    TRIG 122 01/15/2025    TRIG 175 (H) 09/19/2024    TRIG 167 (H) 04/05/2024       Albumin/Creatinine Ratio   Date Value Ref Range Status   12/16/2024 75.9 (H) <30.0 ug/mg Creat Final   08/13/2024 34.0 (H) <30.0 ug/mg Creat Final   01/09/2024 8.4 <30.0 ug/mg Creat Final     DIABETES ASSESSMENT    CURRENT PHARMACOTHERAPY  - Metformin  mg once daily   - Farxiga 10 mg once daily     SECONDARY PREVENTION  - Statin? Yes; rosuvastatin on med list however patient has not been taking the medication  - ACE-I/ARB? Yes; olmesartan on med list    HISTORICAL PHARMACOTHERAPY  - metformin: patient stopped taking this b/c she had ulcers on her butt and she did not want diarrhea to get in it   - trulicity: patient developed hives from the medication, she states she had a hard time breathing and in turn went tot [facility] for further assistance   - jardiance: patient never started the medication  - victoza 0.6 mg under the skin once daily: patient stopped getting the medicine in November 2024    SMBG:   - all numbers around 100-110     DISCUSSION:   Patients A1c was 7.1% (goal under 7%), congrats!   Commended patient for her hard work  Went over current medications:   Discussed current medication regimen:   Patient is tolerating farxiga 10 mg once daily   Patient is tolerating metformin  mg once daily    RECOMMENDATIONS/PLAN  1. Patients diabetes is improving significantly with most recent A1c of 7.1%, down from 16.5% one month prior (goal < 7 %).   - Continue all meds under the continuation of care with the referring provider and clinical pharmacy team.     Clinical Pharmacist follow up: 3 months    Thank you,  Shandra Cuellar, PharmD    Verbal consent to manage patient's drug therapy was obtained from the patient. They were informed they may decline to participate or withdraw from participation in  pharmacy services at any time.

## 2025-04-25 ENCOUNTER — APPOINTMENT (OUTPATIENT)
Dept: PHARMACY | Facility: HOSPITAL | Age: 45
End: 2025-04-25
Payer: MEDICAID

## 2025-04-28 ENCOUNTER — TELEPHONE (OUTPATIENT)
Dept: PRIMARY CARE | Facility: CLINIC | Age: 45
End: 2025-04-28
Payer: MEDICAID

## 2025-04-28 NOTE — TELEPHONE ENCOUNTER
----- Message from Christopher D'Amico sent at 4/23/2025  4:14 PM EDT -----  A1c at 7.1, big improvement, will continue current regimen, consider increasing metformin.  Continue to work on dietary practices.    Vitamin D at 25, can consider OTC vitamin D3, 1000 units daily, if not already taking.  ----- Message -----  From: [email or web address] Results In  Sent: 4/23/2025   8:01 AM EDT  To: Christopher D'Amico, DO

## 2025-04-28 NOTE — TELEPHONE ENCOUNTER
Result Communication    Resulted Orders   Hemoglobin A1C   Result Value Ref Range    HEMOGLOBIN A1c 7.1 (H) <5.7 %      Comment:      For someone without known diabetes, a hemoglobin A1c  value of 6.5% or greater indicates that they may have   diabetes and this should be confirmed with a follow-up   test.     For someone with known diabetes, a value <7% indicates   that their diabetes is well controlled and a value   greater than or equal to 7% indicates suboptimal   control. A1c targets should be individualized based on   duration of diabetes, age, comorbid conditions, and   other considerations.     Currently, no consensus exists regarding use of  hemoglobin A1c for diagnosis of diabetes for children.          eAG (mg/dL) 157 mg/dL    eAG (mmol/L) 8.7 mmol/L   Vitamin D 25-Hydroxy,Total (for eval of Vitamin D levels)   Result Value Ref Range    VITAMIN D,25-OH,TOTAL,IA 25 (L) 30 - 100 ng/mL      Comment:      Vitamin D Status         25-OH Vitamin D:     Deficiency:                    <20 ng/mL  Insufficiency:             20 - 29 ng/mL  Optimal:                 > or = 30 ng/mL     For 25-OH Vitamin D testing on patients on   D2-supplementation and patients for whom quantitation   of D2 and D3 fractions is required, the BgiftyureD(TM)  25-OH VIT D, (D2,D3), LC/MS/MS is recommended: order   code 41339 (patients >2yrs).     See Note 1     Note 1     For additional information, please refer to   http://[email or web address]   (This link is being provided for informational/  educational purposes only.)         12:31 PM      Results were not successfully communicated with the patient and they did not acknowledge their understanding.

## 2025-07-16 ENCOUNTER — APPOINTMENT (OUTPATIENT)
Dept: PRIMARY CARE | Facility: CLINIC | Age: 45
End: 2025-07-16
Payer: MEDICAID

## 2025-07-16 VITALS
HEART RATE: 93 BPM | WEIGHT: 164 LBS | OXYGEN SATURATION: 100 % | DIASTOLIC BLOOD PRESSURE: 83 MMHG | BODY MASS INDEX: 29.06 KG/M2 | SYSTOLIC BLOOD PRESSURE: 119 MMHG | HEIGHT: 63 IN

## 2025-07-16 DIAGNOSIS — F41.9 ANXIETY: ICD-10-CM

## 2025-07-16 DIAGNOSIS — F41.9 ANXIETY AND DEPRESSION: ICD-10-CM

## 2025-07-16 DIAGNOSIS — E11.00 TYPE 2 DIABETES MELLITUS WITH HYPEROSMOLARITY WITHOUT COMA, WITHOUT LONG-TERM CURRENT USE OF INSULIN (MULTI): ICD-10-CM

## 2025-07-16 DIAGNOSIS — E61.2 MAGNESIUM DEFICIENCY: ICD-10-CM

## 2025-07-16 DIAGNOSIS — D64.9 ANEMIA, UNSPECIFIED TYPE: ICD-10-CM

## 2025-07-16 DIAGNOSIS — I10 HYPERTENSION, UNSPECIFIED TYPE: Primary | ICD-10-CM

## 2025-07-16 DIAGNOSIS — Z79.4 TYPE 2 DIABETES MELLITUS WITHOUT COMPLICATION, WITH LONG-TERM CURRENT USE OF INSULIN: ICD-10-CM

## 2025-07-16 DIAGNOSIS — E11.42 TYPE 2 DIABETES MELLITUS WITH DIABETIC POLYNEUROPATHY, WITHOUT LONG-TERM CURRENT USE OF INSULIN: ICD-10-CM

## 2025-07-16 DIAGNOSIS — E11.9 TYPE 2 DIABETES MELLITUS WITHOUT COMPLICATION, WITH LONG-TERM CURRENT USE OF INSULIN: ICD-10-CM

## 2025-07-16 DIAGNOSIS — E78.5 HYPERLIPIDEMIA, UNSPECIFIED HYPERLIPIDEMIA TYPE: ICD-10-CM

## 2025-07-16 DIAGNOSIS — F32.A ANXIETY AND DEPRESSION: ICD-10-CM

## 2025-07-16 DIAGNOSIS — K21.9 GASTROESOPHAGEAL REFLUX DISEASE, UNSPECIFIED WHETHER ESOPHAGITIS PRESENT: ICD-10-CM

## 2025-07-16 DIAGNOSIS — Z00.00 WELLNESS EXAMINATION: ICD-10-CM

## 2025-07-16 DIAGNOSIS — N18.30 STAGE 3 CHRONIC KIDNEY DISEASE, UNSPECIFIED WHETHER STAGE 3A OR 3B CKD (MULTI): ICD-10-CM

## 2025-07-16 PROCEDURE — 99214 OFFICE O/P EST MOD 30 MIN: CPT | Performed by: STUDENT IN AN ORGANIZED HEALTH CARE EDUCATION/TRAINING PROGRAM

## 2025-07-16 PROCEDURE — 3008F BODY MASS INDEX DOCD: CPT | Performed by: STUDENT IN AN ORGANIZED HEALTH CARE EDUCATION/TRAINING PROGRAM

## 2025-07-16 PROCEDURE — 4010F ACE/ARB THERAPY RXD/TAKEN: CPT | Performed by: STUDENT IN AN ORGANIZED HEALTH CARE EDUCATION/TRAINING PROGRAM

## 2025-07-16 PROCEDURE — 3079F DIAST BP 80-89 MM HG: CPT | Performed by: STUDENT IN AN ORGANIZED HEALTH CARE EDUCATION/TRAINING PROGRAM

## 2025-07-16 PROCEDURE — 3074F SYST BP LT 130 MM HG: CPT | Performed by: STUDENT IN AN ORGANIZED HEALTH CARE EDUCATION/TRAINING PROGRAM

## 2025-07-16 RX ORDER — PANTOPRAZOLE SODIUM 40 MG/1
TABLET, DELAYED RELEASE ORAL
Qty: 90 TABLET | Refills: 1 | Status: SHIPPED | OUTPATIENT
Start: 2025-07-16

## 2025-07-16 RX ORDER — ROSUVASTATIN CALCIUM 20 MG/1
20 TABLET, COATED ORAL DAILY
Qty: 90 TABLET | Refills: 1 | Status: SHIPPED | OUTPATIENT
Start: 2025-07-16 | End: 2026-01-12

## 2025-07-16 RX ORDER — LOSARTAN POTASSIUM 50 MG/1
50 TABLET ORAL
Qty: 90 TABLET | Refills: 1 | Status: SHIPPED | OUTPATIENT
Start: 2025-07-16 | End: 2026-01-12

## 2025-07-16 RX ORDER — AMLODIPINE BESYLATE 10 MG/1
10 TABLET ORAL
Qty: 90 TABLET | Refills: 1 | Status: SHIPPED | OUTPATIENT
Start: 2025-07-16 | End: 2026-01-12

## 2025-07-16 RX ORDER — GABAPENTIN 100 MG/1
100 CAPSULE ORAL EVERY 12 HOURS
Qty: 180 CAPSULE | Refills: 1 | Status: SHIPPED | OUTPATIENT
Start: 2025-07-16 | End: 2026-01-12

## 2025-07-16 RX ORDER — ESCITALOPRAM OXALATE 20 MG/1
20 TABLET ORAL DAILY
Qty: 90 TABLET | Refills: 1 | Status: SHIPPED | OUTPATIENT
Start: 2025-07-16 | End: 2026-01-12

## 2025-07-16 RX ORDER — CHLORTHALIDONE 25 MG/1
25 TABLET ORAL DAILY
Qty: 90 TABLET | Refills: 1 | Status: SHIPPED | OUTPATIENT
Start: 2025-07-16 | End: 2026-01-12

## 2025-07-16 RX ORDER — METFORMIN HYDROCHLORIDE 500 MG/1
500 TABLET, EXTENDED RELEASE ORAL
Qty: 90 TABLET | Refills: 1 | Status: SHIPPED | OUTPATIENT
Start: 2025-07-16

## 2025-07-16 RX ORDER — DAPAGLIFLOZIN 10 MG/1
10 TABLET, FILM COATED ORAL DAILY
Qty: 90 TABLET | Refills: 1 | Status: SHIPPED | OUTPATIENT
Start: 2025-07-16 | End: 2026-01-12

## 2025-07-16 RX ORDER — CHOLECALCIFEROL (VITAMIN D3) 25 MCG
25 TABLET ORAL DAILY
Qty: 90 TABLET | Refills: 1 | Status: SHIPPED | OUTPATIENT
Start: 2025-07-16 | End: 2026-01-12

## 2025-07-16 RX ORDER — LANOLIN ALCOHOL/MO/W.PET/CERES
1 CREAM (GRAM) TOPICAL DAILY
Qty: 90 TABLET | Refills: 1 | Status: SHIPPED | OUTPATIENT
Start: 2025-07-16

## 2025-07-16 ASSESSMENT — ENCOUNTER SYMPTOMS
LOSS OF SENSATION IN FEET: 0
OCCASIONAL FEELINGS OF UNSTEADINESS: 0
DEPRESSION: 0

## 2025-07-16 ASSESSMENT — PATIENT HEALTH QUESTIONNAIRE - PHQ9
1. LITTLE INTEREST OR PLEASURE IN DOING THINGS: NOT AT ALL
SUM OF ALL RESPONSES TO PHQ9 QUESTIONS 1 AND 2: 0
2. FEELING DOWN, DEPRESSED OR HOPELESS: NOT AT ALL

## 2025-07-16 NOTE — PROGRESS NOTES
44-year-old female presenting for follow-up on multiple concerns.  Doing relatively well.  Did have a 10 pound weight gain in the interval, which she is disappointed in.  Admits that her diet could be a little better, but is still working on it, not eating any fast food or sweets currently.    DMII  Stable, tolerating regimen well.  Following with [facility] pharmacist.     CKD  Following with nephrology, stable     Anemia  Stable, minimal.     HTN  Stable, tolerates current regimen well.     Anxiety, depression, external stressors  Stable and doing reasonably well on current regimen.  No longer following with counselor.    12 point ROS reviewed and negative other than as stated in HPI    General: Alert, oriented, pleasant, in no acute distress  HEENT:      Head: normocephalic, atraumatic;      eyes: EOMI, no scleral icterus;   CV: Heart with regular rate and rhythm, normal S1/S2, no murmurs  Lungs: CTAB without wheezing, rhonchi or rales; good respiratory effort, no increased work of breathing  Neuro: Cranial nerves grossly intact; alert and oriented  Psych: Appropriate mood and affect    #HTN  - At goal in office  - Continue losartan 50 mg, amlodipine 10 mg qd, chlorthalidone 25 mg qd  - Repeat CMP    #DMII  -Hemoglobin A1c 7.1, oh/2025  - Currently on metformin  mg daily, Farxiga 10 mg daily  - Ophthalmology referral given last appointment  - Diabetic foot exam negative 01/2025  - Continue to follow with [facility] pharmacist  - Repeat A1c, UACR    #Diabetic neuropathy  -Continue gabapentin 100 mg twice daily as needed    #HLD  - Continue rosuvastatin 20 mg daily  - Repeat lipid panel    #CKD3a  - Stable  - Continue to follow with nephrology    #Anemia  - Repeat CBC    #GERD  - Continue pantoprazole 40 mg qd    #Stress #Anxiety #Depression  - No longer following with [facility] behavioral health  - Continue Lexapro 20 mg daily    F/U 3-6 months, sooner if indicated, CPE in January    Chris D'Amico, DO

## 2025-07-17 LAB
ALBUMIN SERPL-MCNC: 4.5 G/DL (ref 3.6–5.1)
ALP SERPL-CCNC: 34 U/L (ref 31–125)
ALT SERPL-CCNC: 29 U/L (ref 6–29)
ANION GAP SERPL CALCULATED.4IONS-SCNC: 8 MMOL/L (CALC) (ref 7–17)
AST SERPL-CCNC: 31 U/L (ref 10–35)
BILIRUB SERPL-MCNC: 0.3 MG/DL (ref 0.2–1.2)
BUN SERPL-MCNC: 38 MG/DL (ref 7–25)
CALCIUM SERPL-MCNC: 8.9 MG/DL (ref 8.6–10.2)
CHLORIDE SERPL-SCNC: 101 MMOL/L (ref 98–110)
CHOLEST SERPL-MCNC: 175 MG/DL
CHOLEST/HDLC SERPL: 2.4 (CALC)
CO2 SERPL-SCNC: 25 MMOL/L (ref 20–32)
CREAT SERPL-MCNC: 1.67 MG/DL (ref 0.5–0.99)
EGFRCR SERPLBLD CKD-EPI 2021: 38 ML/MIN/1.73M2
ERYTHROCYTE [DISTWIDTH] IN BLOOD BY AUTOMATED COUNT: 17.6 % (ref 11–15)
EST. AVERAGE GLUCOSE BLD GHB EST-MCNC: 157 MG/DL
EST. AVERAGE GLUCOSE BLD GHB EST-SCNC: 8.7 MMOL/L
GLUCOSE SERPL-MCNC: 168 MG/DL (ref 65–99)
HBA1C MFR BLD: 7.1 %
HCT VFR BLD AUTO: 35.2 % (ref 35–45)
HDLC SERPL-MCNC: 72 MG/DL
HGB BLD-MCNC: 10.9 G/DL (ref 11.7–15.5)
LDLC SERPL CALC-MCNC: 76 MG/DL (CALC)
MCH RBC QN AUTO: 27.8 PG (ref 27–33)
MCHC RBC AUTO-ENTMCNC: 31 G/DL (ref 32–36)
MCV RBC AUTO: 89.8 FL (ref 80–100)
NONHDLC SERPL-MCNC: 103 MG/DL (CALC)
PLATELET # BLD AUTO: 160 THOUSAND/UL (ref 140–400)
PMV BLD REES-ECKER: 12.3 FL (ref 7.5–12.5)
POTASSIUM SERPL-SCNC: 3.7 MMOL/L (ref 3.5–5.3)
PROT SERPL-MCNC: 8.3 G/DL (ref 6.1–8.1)
RBC # BLD AUTO: 3.92 MILLION/UL (ref 3.8–5.1)
SODIUM SERPL-SCNC: 134 MMOL/L (ref 135–146)
TRIGL SERPL-MCNC: 170 MG/DL
WBC # BLD AUTO: 4.4 THOUSAND/UL (ref 3.8–10.8)

## 2025-07-19 ENCOUNTER — RESULTS FOLLOW-UP (OUTPATIENT)
Dept: PRIMARY CARE | Facility: CLINIC | Age: 45
End: 2025-07-19
Payer: MEDICAID

## 2025-07-19 DIAGNOSIS — R77.9 ELEVATED SERUM PROTEIN LEVEL: Primary | ICD-10-CM

## 2025-07-19 NOTE — RESULT ENCOUNTER NOTE
Mild anemia with hemoglobin of 10.9.  Mostly stable and near baseline.  Likely secondary to chronic kidney disease/diabetes.    Hemoglobin A1c at 7.1, unchanged from last lab.  Still much better than historical blood sugar.  I would still like to see this a little bit better given the young age, continue to work on improving diet, continue to work with Shandra the pharmacist to optimize medication.    GFR of 38, creatinine 1.67, my notes say that she is following with nephrology, though I do not see any notes in the chart, perhaps the provider is out of our network.  She should continue to follow with nephrology, and if not seeing one, we will put in a referral.    Borderline elevation in serum protein.  This has been persistently slightly high, I think we should get a protein electrophoresis to better evaluate.  This blood draw does not require fasting, can be done at patient's convenience.    LDL at 76, near diabetic goal 70, triglycerides borderline elevated at 170, likely will improve with better sugar control.    Remaining labs unremarkable

## 2025-07-23 DIAGNOSIS — N18.30 STAGE 3 CHRONIC KIDNEY DISEASE, UNSPECIFIED WHETHER STAGE 3A OR 3B CKD (MULTI): Primary | ICD-10-CM

## 2025-07-28 ENCOUNTER — LAB (OUTPATIENT)
Dept: LAB | Facility: HOSPITAL | Age: 45
End: 2025-07-28
Payer: MEDICAID

## 2025-07-28 DIAGNOSIS — R77.9 ABNORMALITY OF PLASMA PROTEIN, UNSPECIFIED: Primary | ICD-10-CM

## 2025-07-28 LAB — PROT SERPL-MCNC: 8 G/DL (ref 6.4–8.2)

## 2025-07-28 PROCEDURE — 86334 IMMUNOFIX E-PHORESIS SERUM: CPT

## 2025-07-28 PROCEDURE — 84155 ASSAY OF PROTEIN SERUM: CPT

## 2025-07-28 PROCEDURE — 86320 SERUM IMMUNOELECTROPHORESIS: CPT

## 2025-07-28 PROCEDURE — 84165 PROTEIN E-PHORESIS SERUM: CPT

## 2025-07-28 PROCEDURE — 36415 COLL VENOUS BLD VENIPUNCTURE: CPT

## 2025-07-30 ENCOUNTER — APPOINTMENT (OUTPATIENT)
Dept: PHARMACY | Facility: HOSPITAL | Age: 45
End: 2025-07-30
Payer: MEDICAID

## 2025-07-30 DIAGNOSIS — E11.00 TYPE 2 DIABETES MELLITUS WITH HYPEROSMOLARITY WITHOUT COMA, WITHOUT LONG-TERM CURRENT USE OF INSULIN (MULTI): Primary | ICD-10-CM

## 2025-07-30 PROCEDURE — RXMED WILLOW AMBULATORY MEDICATION CHARGE

## 2025-07-30 RX ORDER — ISOPROPYL ALCOHOL 70 ML/100ML
SWAB TOPICAL
Qty: 100 EACH | Refills: 5 | Status: SHIPPED | OUTPATIENT
Start: 2025-07-30

## 2025-07-30 RX ORDER — LIRAGLUTIDE 6 MG/ML
0.6 INJECTION SUBCUTANEOUS DAILY
Qty: 9 ML | Refills: 2 | Status: SHIPPED | OUTPATIENT
Start: 2025-07-30

## 2025-07-30 RX ORDER — PEN NEEDLE, DIABETIC 30 GX3/16"
NEEDLE, DISPOSABLE MISCELLANEOUS
Qty: 100 EACH | Refills: 2 | Status: SHIPPED | OUTPATIENT
Start: 2025-07-30

## 2025-07-30 NOTE — PROGRESS NOTES
Pharmacist Clinic: Diabetes Management  Jennifer Overton is a 45 y.o. female was referred to Clinical Pharmacy Team for diabetes management.     Referring Provider: Christopher D'Amico, DOChristopher D'Amico, DO    HISTORY OF PRESENT ILLNESS   Date of diagnosis: 2021  History of illness:   - Patient was hospitalized in may of 2023 for DKA with an A1c of 16.5%, her only medication at this time was metformin, and discharged on insulin  - 1 month later she was hospitalized for hypoglycemia, her A1c 28 days post 16.5% was 8.9%   - Patient was established with pharmacy team, she was comfortable on victoza and farxiga and discharged from pharmacy services due to controlled DM   - Patient then went without Victoza and has been re-referred to pharmacy team due to an elevated A1c    Diet:   - 2 meals per day, patient cut out bread and added sugars   - Coffee in the AM, water throughout the day   - Since starting to work with the pharmacy team, she has incorporated listening to her body and only eating when she is hungry      LAB REVIEW   GLUCOSE (mg/dL)   Date Value   07/16/2025 168 (H)     Glucose (mg/dL)   Date Value   01/15/2025 182 (H)   12/16/2024 177 (H)   09/19/2024 209 (H)     HEMOGLOBIN A1c (%)   Date Value   07/16/2025 7.1 (H)   04/22/2025 7.1 (H)     Hemoglobin A1C (%)   Date Value   01/15/2025 7.3 (H)   09/19/2024 8.0 (H)   04/05/2024 7.3 (H)     CARBON DIOXIDE (mmol/L)   Date Value   07/16/2025 25     Bicarbonate (mmol/L)   Date Value   01/15/2025 25   12/16/2024 23   09/19/2024 23     UREA NITROGEN (BUN) (mg/dL)   Date Value   07/16/2025 38 (H)     Urea Nitrogen (mg/dL)   Date Value   01/15/2025 32 (H)   12/16/2024 30 (H)   09/19/2024 43 (H)     Creatinine (mg/dL)   Date Value   01/15/2025 1.47 (H)   12/16/2024 1.40 (H)   09/19/2024 1.50 (H)     CREATININE (mg/dL)   Date Value   07/16/2025 1.67 (H)     Lab Results   Component Value Date    HGBA1C 7.1 (H) 07/16/2025    HGBA1C 7.1 (H) 04/22/2025    HGBA1C 7.3 (H)  01/15/2025     Lab Results   Component Value Date    CHOL 175 07/16/2025    CHOL 241 (H) 01/15/2025    CHOL 255 (H) 09/19/2024     Lab Results   Component Value Date    HDL 72 07/16/2025    .8 01/15/2025    HDL 83.1 09/19/2024     Lab Results   Component Value Date    LDLCALC 76 07/16/2025    LDLCALC 116 (H) 01/15/2025    LDLCALC 137 (H) 09/19/2024     Lab Results   Component Value Date    TRIG 170 (H) 07/16/2025    TRIG 122 01/15/2025    TRIG 175 (H) 09/19/2024       Albumin/Creatinine Ratio   Date Value Ref Range Status   12/16/2024 75.9 (H) <30.0 ug/mg Creat Final   08/13/2024 34.0 (H) <30.0 ug/mg Creat Final   01/09/2024 8.4 <30.0 ug/mg Creat Final     DIABETES ASSESSMENT    CURRENT PHARMACOTHERAPY  - Metformin  mg once daily   - Farxiga 10 mg once daily     SECONDARY PREVENTION  - Statin? Yes; rosuvastatin on med list however patient has not been taking the medication  - ACE-I/ARB? Yes; olmesartan on med list    HISTORICAL PHARMACOTHERAPY  - metformin: patient stopped taking this b/c she had ulcers on her butt and she did not want diarrhea to get in it   - trulicity: patient developed hives from the medication, she states she had a hard time breathing and in turn went tot [facility] for further assistance   - jardiance: patient never started the medication  - victoza 0.6 mg under the skin once daily: patient stopped getting the medicine in November 2024    SMBG:   - all numbers around 100-110     DISCUSSION:   Patients A1c was 7.1% (goal under 7%)  Commended patient for her hard work  Discussed we can be more aggressive with lowering our A1c given young and and co-morbidities  Went over current medications:   Discussed current medication regimen:   Patient is tolerating farxiga 10 mg once daily   Patient is tolerating metformin  mg once daily  Discussed adding a medication from the glp1 class of medicines to better help lower your A1c   Went over restarting victoza 0.6 mg once weekly   Went  over moa, expectations, side effects, administration, etc  Counseled patient, answered all questions and concerns    RECOMMENDATIONS/PLAN  1. Patients diabetes is improving significantly with most recent A1c of 7.1% (goal < 7 %).   - Continue all meds under the continuation of care with the referring provider and clinical pharmacy team.   - Initiate victorza 0.6 mg under the skin once weekly.     Clinical Pharmacist follow up: 2 weeks    Thank you,  Shandra Wilcox, PharmD    Verbal consent to manage patient's drug therapy was obtained from the patient. They were informed they may decline to participate or withdraw from participation in pharmacy services at any time.

## 2025-08-01 ENCOUNTER — PHARMACY VISIT (OUTPATIENT)
Dept: PHARMACY | Facility: CLINIC | Age: 45
End: 2025-08-01
Payer: MEDICAID

## 2025-08-03 LAB
ALBUMIN: 4.1 G/DL (ref 3.4–5)
ALPHA 1 GLOBULIN: 0.3 G/DL (ref 0.2–0.6)
ALPHA 2 GLOBULIN: 0.8 G/DL (ref 0.4–1.1)
BETA GLOBULIN: 1.2 G/DL (ref 0.5–1.2)
GAMMA GLOBULIN: 1.6 G/DL (ref 0.5–1.4)
IMMUNOFIXATION COMMENT: NORMAL
PATH REVIEW - SERUM IMMUNOFIXATION: NORMAL
PATH REVIEW-SERUM PROTEIN ELECTROPHORESIS: ABNORMAL
PROTEIN ELECTROPHORESIS COMMENT: ABNORMAL

## 2025-08-04 NOTE — RESULT ENCOUNTER NOTE
Protein electrophoresis showed no monoclonal protein increase, which is good news.  Will continue to monitor protein levels.

## 2025-08-05 DIAGNOSIS — Z12.31 ENCOUNTER FOR SCREENING MAMMOGRAM FOR BREAST CANCER: ICD-10-CM

## 2025-08-06 NOTE — TELEPHONE ENCOUNTER
Left message for patient----- Message from Christopher D'Amico sent at 8/4/2025  2:06 PM EDT -----  Protein electrophoresis showed no monoclonal protein increase, which is good news.  Will continue to monitor protein levels.  ----- Message -----  From: Lab, Background User  Sent: 7/28/2025  11:12 PM EDT  To: Christopher D'Amico, DO

## 2025-08-13 ENCOUNTER — APPOINTMENT (OUTPATIENT)
Dept: PHARMACY | Facility: HOSPITAL | Age: 45
End: 2025-08-13
Payer: MEDICAID

## 2025-08-13 DIAGNOSIS — E11.00 TYPE 2 DIABETES MELLITUS WITH HYPEROSMOLARITY WITHOUT COMA, WITHOUT LONG-TERM CURRENT USE OF INSULIN (MULTI): Primary | ICD-10-CM

## 2025-09-05 ENCOUNTER — APPOINTMENT (OUTPATIENT)
Dept: PHARMACY | Facility: HOSPITAL | Age: 45
End: 2025-09-05
Payer: MEDICAID

## 2025-10-03 ENCOUNTER — APPOINTMENT (OUTPATIENT)
Dept: PHARMACY | Facility: HOSPITAL | Age: 45
End: 2025-10-03
Payer: MEDICAID

## 2026-01-14 ENCOUNTER — APPOINTMENT (OUTPATIENT)
Dept: PRIMARY CARE | Facility: CLINIC | Age: 46
End: 2026-01-14
Payer: MEDICAID